# Patient Record
Sex: MALE | Race: ASIAN | Employment: UNEMPLOYED | URBAN - METROPOLITAN AREA
[De-identification: names, ages, dates, MRNs, and addresses within clinical notes are randomized per-mention and may not be internally consistent; named-entity substitution may affect disease eponyms.]

---

## 2017-01-20 ENCOUNTER — OFFICE VISIT (OUTPATIENT)
Dept: FAMILY MEDICINE | Facility: CLINIC | Age: 52
End: 2017-01-20

## 2017-01-20 VITALS
WEIGHT: 198 LBS | SYSTOLIC BLOOD PRESSURE: 145 MMHG | DIASTOLIC BLOOD PRESSURE: 101 MMHG | BODY MASS INDEX: 33.97 KG/M2 | OXYGEN SATURATION: 96 % | HEART RATE: 82 BPM | TEMPERATURE: 98.1 F

## 2017-01-20 DIAGNOSIS — E11.9 TYPE 2 DIABETES MELLITUS WITHOUT COMPLICATION, WITHOUT LONG-TERM CURRENT USE OF INSULIN (H): ICD-10-CM

## 2017-01-20 DIAGNOSIS — I10 ESSENTIAL HYPERTENSION, BENIGN: ICD-10-CM

## 2017-01-20 DIAGNOSIS — M10.072 ACUTE IDIOPATHIC GOUT OF LEFT ANKLE: Primary | ICD-10-CM

## 2017-01-20 DIAGNOSIS — I10 ESSENTIAL HYPERTENSION, BENIGN: Primary | ICD-10-CM

## 2017-01-20 DIAGNOSIS — R21 RASH: ICD-10-CM

## 2017-01-20 DIAGNOSIS — E78.5 HYPERLIPIDEMIA, UNSPECIFIED HYPERLIPIDEMIA TYPE: ICD-10-CM

## 2017-01-20 LAB
BUN SERPL-MCNC: 17.4 MG/DL (ref 7–21)
CALCIUM SERPL-MCNC: 10.1 MG/DL (ref 8.5–10.1)
CHLORIDE SERPLBLD-SCNC: 96.1 MMOL/L (ref 98–110)
CHOLEST SERPL-MCNC: 226.6 MG/DL (ref 0–200)
CHOLEST/HDLC SERPL: 5.2 {RATIO} (ref 0–5)
CO2 SERPL-SCNC: 28.6 MMOL/L (ref 20–32)
CREAT SERPL-MCNC: 0.8 MG/DL (ref 0.7–1.3)
GFR SERPL CREATININE-BSD FRML MDRD: 108.3 ML/MIN/1.7 M2
GLUCOSE SERPL-MCNC: 309.7 MG'DL (ref 70–99)
HBA1C MFR BLD: 8.3 % (ref 4.1–5.7)
HDLC SERPL-MCNC: 43.9 MG/DL
LDLC SERPL CALC-MCNC: 112 MG/DL (ref 0–129)
POTASSIUM SERPL-SCNC: 3.8 MMOL/DL (ref 3.2–4.6)
SODIUM SERPL-SCNC: 133.3 MMOL/L (ref 132–142)
TRIGL SERPL-MCNC: 354.5 MG/DL (ref 0–150)
URATE SERPL-MCNC: 7.2 MG/DL (ref 3–8)
VLDL CHOLESTEROL: 70.9 MG/DL (ref 7–32)

## 2017-01-20 RX ORDER — INDOMETHACIN 25 MG/1
CAPSULE ORAL
Qty: 60 CAPSULE | Refills: 1 | Status: SHIPPED | OUTPATIENT
Start: 2017-01-20 | End: 2017-05-25

## 2017-01-20 RX ORDER — TRIAMCINOLONE ACETONIDE 1 MG/ML
LOTION TOPICAL 2 TIMES DAILY
Qty: 60 ML | Refills: 1 | Status: SHIPPED | OUTPATIENT
Start: 2017-01-20 | End: 2019-09-17

## 2017-01-20 RX ORDER — INDOMETHACIN 25 MG/1
CAPSULE ORAL
Qty: 60 CAPSULE | Refills: 1 | Status: SHIPPED | OUTPATIENT
Start: 2017-01-20 | End: 2017-01-20

## 2017-01-20 NOTE — MR AVS SNAPSHOT
After Visit Summary   2017    Kyle Silva    MRN: [id number]           Patient Information     Date Of Birth          1965        Visit Information        Provider Department      2017 3:30 PM You Mitchell MD [facility]        Today's Diagnoses     Acute idiopathic gout of left ankle    -  1     Essential hypertension, benign         Rash         Type 2 diabetes mellitus without complication, without long-term current use of insulin (H)            Follow-ups after your visit        Who to contact     Please call your clinic at [phone] to:    Ask questions about your health    Make or cancel appointments    Discuss your medicines    Learn about your test results    Speak to your doctor   If you have compliments or concerns about an experience at your clinic, or if you wish to file a complaint, please contact [facility] Patient Relations at [phone] or email us at [email or web address]         Additional Information About Your Visit        MyChart Information     [employer] is an electronic gateway that provides easy, online access to your medical records. With [employer], you can request a clinic appointment, read your test results, renew a prescription or communicate with your care team.     To sign up for [employer] visit the website at [email or web address]   You will be asked to enter the access code listed below, as well as some personal information. Please follow the directions to create your username and password.     Your access code is: [email or web address]  Expires: 2017  4:54 PM     Your access code will  in 90 days. If you need help or a new code, please contact your [facility] or call [phone] for assistance.        Care EveryWhere ID     This is your Care EveryWhere ID. This could be used by other organizations to access your [facility] medical records  [id number]        Your Vitals  Were     Pulse Temperature Pulse Oximetry             82 98.1  F (36.7  C) (Oral) 96%          Blood Pressure from Last 3 Encounters:   01/20/17 145/101   12/18/15 138/89   12/02/15 176/108    Weight from Last 3 Encounters:   01/20/17 198 lb (89.812 kg)   12/18/15 192 lb 9.6 oz (87.363 kg)   12/02/15 190 lb 3.2 oz (86.274 kg)              We Performed the Following     Basic Metabolic Panel ([facility])     Hemoglobin A1c ([facility])     Lipid Panel ([facility])     Uric Acid ([facility])          Today's Medication Changes          These changes are accurate as of: 1/20/17  4:54 PM.  If you have any questions, ask your nurse or doctor.               Start taking these medicines.        Dose/Directions    indomethacin 25 MG capsule   Commonly known as:  INDOCIN   Used for:  Acute idiopathic gout of left ankle   Started by:  You Mitchell MD        2 caps po tid for 2 days, then 1 caps po tid for 2 days, then every 8 hours as needed.   Quantity:  60 capsule   Refills:  1       metFORMIN 500 MG tablet   Commonly known as:  GLUCOPHAGE   Used for:  Type 2 diabetes mellitus without complication, without long-term current use of insulin (H)   Started by:  You Mitchell MD        1 po q supper for 2 weeks, then 1 po bid for 2 weeks, then 1 po q am and 2 po q supper for 2 weeks, then 2 po bid   Quantity:  120 tablet   Refills:  2       triamcinolone 0.1 % lotion   Commonly known as:  KENALOG   Used for:  Rash   Started by:  You Mitchell MD        Apply topically 2 times daily As needed to scalp   Quantity:  60 mL   Refills:  1            Where to get your medicines      These medications were sent to [id number] - [place], [place] - [address] AT SEC [address]  [address], [place] [address]     Phone:  [phone]    - indomethacin 25 MG capsule  - metFORMIN 500 MG tablet  - triamcinolone 0.1 % lotion             Primary Care Provider Office Phone # Fax #     You Mitchell [phone]       [address] [address]        Thank you!     Thank you for choosing [facility]  for your care. Our goal is always to provide you with excellent care. Hearing back from our patients is one way we can continue to improve our services. Please take a few minutes to complete the written survey that you may receive in the mail after your visit with us. Thank you!             Your Updated Medication List - Protect others around you: Learn how to safely use, store and throw away your medicines at www.disposemymeds.org.          This list is accurate as of: 1/20/17  4:54 PM.  Always use your most recent med list.                   Brand Name Dispense Instructions for use    amLODIPine 10 MG tablet    NORVASC    30 tablet    Take 1 tablet (10 mg) by mouth daily       Blood Pressure Monitor Kit      Use as directed       hydrochlorothiazide 25 MG tablet    HYDRODIURIL    30 tablet    Take 1 tablet (25 mg) by mouth daily       indomethacin 25 MG capsule    INDOCIN    60 capsule    2 caps po tid for 2 days, then 1 caps po tid for 2 days, then every 8 hours as needed.       losartan 25 MG tablet    COZAAR    30 tablet    Take 1 tablet (25 mg) by mouth daily       metFORMIN 500 MG tablet    GLUCOPHAGE    120 tablet    1 po q supper for 2 weeks, then 1 po bid for 2 weeks, then 1 po q am and 2 po q supper for 2 weeks, then 2 po bid       metoprolol 25 MG 24 hr tablet    TOPROL-XL    30 tablet    Take 1 po qhs       triamcinolone 0.1 % lotion    KENALOG    60 mL    Apply topically 2 times daily As needed to scalp       TYLENOL 8 HOUR PO

## 2017-01-20 NOTE — PROGRESS NOTES
Patient Active Problem List    Diagnosis Date Noted     Noncompliance with medication regimen 12/03/2015     Priority: Medium     S/P ACL reconstruction 12/11/2014     Priority: Medium     Dr Mckeon @ [place]  Hamstring autograft  Parital medial and lateral meniscectomy       Essential hypertension, benign 03/21/2013     Priority: Medium     Intracranial hemorrhage (H) 03/21/2013     Priority: Medium     Has seen Dr Justina Whitaker @ [facility]  Problem list name updated by automated process. Provider to review       Major depressive disorder, single episode 03/21/2013     Priority: Medium     Problem list name updated by automated process. Provider to review       Obstructive sleep apnea 03/21/2013     Priority: Medium     Problem list name updated by automated process. Provider to review       Health Care Home 03/21/2013     Priority: Medium     Tier Level: 1  DX V65.8 REPLACED WITH [id number] HEALTH CARE HOME (04/08/2013)       There are no exam notes on file for this visit.  Chief Complaint   Patient presents with     RECHECK     Recheck Medication     Musculoskeletal Problem     left foot pain 12/15/16 - swelling - redness - warm to touch. is a little better now. pain is sharp - comes and goes. heel pain 2 weeks before when pushing left foot into boot.     Derm Problem     spot above left left ear x 3 - 4 months     Blood pressure 145/101, pulse 82, temperature 98.1  F (36.7  C), temperature source Oral, weight 198 lb (89.812 kg), SpO2 96 %.     SUBJECTIVE:  Kyle Silva is here with his wife today for followup of multiple problems.  His chief concern is left foot pain.  He had it for about a month.  It is associated with swelling and warmth.  It is very painful to weightbear.  It seemed to get worse when he ate shrimp or drank even a small amount of beer or alcohol.  No trauma.  No fever.  No prior history of gout or family history of gout.   He also needs followup of a spot above the left ear that is mildly itchy.   It isn't associated with hair loss.  It has been there for three or four months.   He also needs followup of hypertension.  He states that at home he is typically in the 130s/80s.  He doesn't have chest pain or shortness of breath.  He doesn't have nocturia, but he has polydipsia and polyuria.  He drinks two glasses of juice and two cans of pop per day.  His hypertension is complicated by an intracranial hemorrhage in 2003.  He is disabled as a result.   OBJECTIVE:     GENERAL:  Very pleasant male in no acute distress today.     VITAL SIGNS:  Blood pressure is elevated at 145/98 on my repeat.     SKIN:  His scalp has a circumferential lesion in the left temporal area that is about two cm, with slightly raised borders and no central clearing.  No alopecia.  He has a similar patch on the right side of his scalp as well, but it is considerably smaller.  It is nonspecific.  No rashes on the elbows.  He has a similar rash in the right axilla that is red, without central clearing, and located at the crease of the axilla.   CHEST:  Clear.   HEART:  Regular rate and rhythm.  Normal S1 and S2.  No murmur.   ABDOMEN:  Obese, but soft and nontender.   EXTREMITIES:  No edema.   MUSCULOSKELETAL:   In the left ankle, he has limited range of motion, tenderness over the joints, and moderate effusion and warmth.  There is no evidence of cellulitis.  Ankle is the area of swelling.  The rest of the foot, including the first MTP is negative.   LABS:  Labs are back and A1C is elevated, which is a new finding.  Blood sugar is 309.  Lipids are abnormal as well.   ASSESSMENT AND PLAN:   1.  Probable gout, left foot.  We'll check uric acid level and treat with indomethacin 50 mg t.i.d. x two days, then 25 mg t.i.d. x 2 days, and then 25 mg p.r.n.  This is a presumptive diagnosis at this point in time; but, given his monoarticular arthritis that seems to vary with food intake, it is the most likely diagnosis.  Follow up in one week if not  improving.     2.  Rash.  This seems to be eczematous tinea that is associated with alopecia.  We'll try triamcinolone lotion b.i.d. p.r.n. Follow up if it doesn't improve in the next two weeks.   3.  Hypertension, suboptimal control.  He'll continue home monitoring and we'll reevaluate in 3-4 weeks.   4.  Diabetes, new diagnosis.  A prescription for test strips and lancets was given.  Advice was given to stop the sugary beverages and limit portions of rice.  Diabetic education referral was given.  I want him to check blood sugars b.i.d. before meals.  We'll have him come back next week for nurse visit to learn about monitoring here in the office, but also a diabetic referral was given.  Follow up for that in one month.   5.  Hyperlipidemia.  He'll need treatment with statin.  We'll discuss this next time.       Results for orders placed or performed in visit on 01/20/17   Hemoglobin A1c ([facility])   Result Value Ref Range    Hemoglobin A1C 8.3 (H) 4.1 - 5.7 %   Basic Metabolic Panel ([place])   Result Value Ref Range    Urea Nitrogen 17.4 7.0 - 21.0 mg/dL    Calcium 10.1 8.5 - 10.1 mg/dL    Chloride 96.1 (L) 98.0 - 110.0 mmol/L    Carbon Dioxide 28.6 20.0 - 32.0 mmol/L    Creatinine 0.8 0.7 - 1.3 mg/dL    Glucose 309.7 (H) 70.0 - 99.0 mg'dL    Potassium 3.8 3.2 - 4.6 mmol/dL    Sodium 133.3 132.0 - 142.0 mmol/L    GFR Estimate 108.3 mL/min/1.7 m2    GFR Estimate If Black 131.1 mL/min/1.7 m2   Lipid Panel ([place])   Result Value Ref Range    Cholesterol 226.6 (H) 0.0 - 200.0 mg/dL    Cholesterol/HDL Ratio 5.2 (H) 0.0 - 5.0    HDL Cholesterol 43.9 >40.0 mg/dL    LDL Cholesterol Calculated 112 0 - 129 mg/dL    Triglycerides 354.5 (H) 0.0 - 150.0 mg/dL    VLDL Cholesterol 70.9 (H) 7.0 - 32.0 mg/dL

## 2017-01-20 NOTE — LETTER
[address] [address]  Phone: [phone]  Fax: [phone]             Dear Mr. Silva         I tried calling you many times, but there is no answer.  This is the question that Dr. Mitchell want to know so please call us back and we discuss. Thanks!    Please call pt through [facility] staff:   1.  I want to confirm that he is making an appointment with diabetic education, where he was referred.  Please confirm.   2.  His cholesterol is high.  He is likely to need medication to control that in the future, and we can discuss it at his next follow-up, which should be in March.   3.  Is his foot getting better?  I assume that since he has not returned, it has improved.   Thanks   ERIBERTO Mitchell MD    Thank you very much      Lisa Elder CMA

## 2017-01-23 ENCOUNTER — TELEPHONE (OUTPATIENT)
Dept: FAMILY MEDICINE | Facility: CLINIC | Age: 52
End: 2017-01-23

## 2017-01-23 PROBLEM — E11.9 TYPE 2 DIABETES MELLITUS WITHOUT COMPLICATION, WITHOUT LONG-TERM CURRENT USE OF INSULIN (H): Status: ACTIVE | Noted: 2017-01-23

## 2017-01-23 PROBLEM — E78.5 HYPERLIPIDEMIA, UNSPECIFIED HYPERLIPIDEMIA TYPE: Status: ACTIVE | Noted: 2017-01-23

## 2017-01-23 NOTE — PATIENT INSTRUCTIONS
DM EDUCATION REFERRAL  Waiting for final encounter to close Madonna Wright 1/23/2017 12:11 PM  [facility]  [phone]  Referral and records have been faxed they will contact pt to schedule  Madonna Wright 12:00 PM 2/2/2017

## 2017-01-23 NOTE — TELEPHONE ENCOUNTER
----- Message from You Mitchell MD sent at 1/21/2017 12:13 PM CST -----  Regarding: please call Monday  Shira needs a nurse visit early next week primarily for diabetes:  Needs education on self-monitoring and initial nutritional therapy.  Should have f/up appt w/ me in 1 month.  Please let them know I have sent rx's for diabetic supplies to his pharmacy and he should bring them to the nurse visit.  THANKS!  CF

## 2017-01-24 NOTE — TELEPHONE ENCOUNTER
She scheduled an appointment in February with  and I relayed the message re the supplies at the nurse visit.

## 2017-02-02 ENCOUNTER — COMMUNICATION - HEALTHEAST (OUTPATIENT)
Dept: ENDOCRINOLOGY | Facility: CLINIC | Age: 52
End: 2017-02-02

## 2017-02-02 ENCOUNTER — RECORDS - HEALTHEAST (OUTPATIENT)
Dept: ADMINISTRATIVE | Facility: OTHER | Age: 52
End: 2017-02-02

## 2017-02-08 NOTE — PROGRESS NOTES
Quick Note:    Please call pt through [facility] staff:  1. I want to confirm that he is making an appointment with diabetic education, where he was referred. Please confirm.  2. His cholesterol is high. He is likely to need medication to control that in the future, and we can discuss it at his next follow-up, which should be in March.  3. Is his foot getting better? I assume that since he has not returned, it has improved.  Thanks  ERIBERTO Mitchell  ______

## 2017-03-01 ENCOUNTER — TELEPHONE (OUTPATIENT)
Dept: FAMILY MEDICINE | Facility: CLINIC | Age: 52
End: 2017-03-01

## 2017-03-01 NOTE — TELEPHONE ENCOUNTER
[facility] phone call message- general phone call:    Reason for call: They have tried to schedule this pt for DM ed but unable to reach the pt     Return call needed: Yes    OK to leave a message on voice mail? Yes    Primary language: English      needed? No    Call taken on March 1, 2017 at 8:27 AM by Gia Smith

## 2017-03-01 NOTE — TELEPHONE ENCOUNTER
.  Sisi  This patient is a new diabetic in need of f/up.  Can you assist with outreach?  He needs diabetic ed and f/up here.  ERIBERTO Mitchell

## 2017-05-09 ENCOUNTER — OFFICE VISIT (OUTPATIENT)
Dept: FAMILY MEDICINE | Facility: CLINIC | Age: 52
End: 2017-05-09

## 2017-05-09 VITALS
HEART RATE: 79 BPM | TEMPERATURE: 98 F | BODY MASS INDEX: 34.06 KG/M2 | SYSTOLIC BLOOD PRESSURE: 194 MMHG | DIASTOLIC BLOOD PRESSURE: 116 MMHG | WEIGHT: 198.4 LBS

## 2017-05-09 DIAGNOSIS — I10 ESSENTIAL HYPERTENSION, BENIGN: ICD-10-CM

## 2017-05-09 DIAGNOSIS — E78.5 HYPERLIPIDEMIA, UNSPECIFIED HYPERLIPIDEMIA TYPE: ICD-10-CM

## 2017-05-09 DIAGNOSIS — M10.9 ACUTE GOUTY ARTHRITIS: Primary | ICD-10-CM

## 2017-05-09 DIAGNOSIS — E11.8 TYPE 2 DIABETES MELLITUS WITH COMPLICATION, UNSPECIFIED LONG TERM INSULIN USE STATUS: Primary | ICD-10-CM

## 2017-05-09 DIAGNOSIS — L40.9 PSORIASIS: ICD-10-CM

## 2017-05-09 DIAGNOSIS — E11.8 TYPE 2 DIABETES MELLITUS WITH COMPLICATION, UNSPECIFIED LONG TERM INSULIN USE STATUS: ICD-10-CM

## 2017-05-09 LAB
ALBUMIN SERPL-MCNC: 4.3 MG/DL (ref 3.9–5.1)
ALP SERPL-CCNC: 79 U/L (ref 40–150)
ALT SERPL-CCNC: 29.7 U/L (ref 0–45)
AST SERPL-CCNC: 22.8 U/L (ref 0–55)
BILIRUB SERPL-MCNC: 1.3 MG/DL (ref 0.2–1.3)
BUN SERPL-MCNC: 14.8 MG/DL (ref 7–21)
CALCIUM SERPL-MCNC: 10.1 MG/DL (ref 8.5–10.1)
CHLORIDE SERPLBLD-SCNC: 102 MMOL/L (ref 98–110)
CHOLEST SERPL-MCNC: 234.6 MG/DL (ref 0–200)
CHOLEST/HDLC SERPL: 5.2 {RATIO} (ref 0–5)
CO2 SERPL-SCNC: 24.2 MMOL/L (ref 20–32)
CREAT SERPL-MCNC: 0.9 MG/DL (ref 0.7–1.3)
CREAT UR-MCNC: 41.2 MG/DL
GFR SERPL CREATININE-BSD FRML MDRD: >90 ML/MIN/1.7 M2
GLUCOSE SERPL-MCNC: 131.7 MG'DL (ref 70–99)
HBA1C MFR BLD: 7.6 % (ref 4.1–5.7)
HDLC SERPL-MCNC: 44.8 MG/DL
LDLC SERPL CALC-MCNC: 166 MG/DL (ref 0–129)
MICROALBUMIN UR-MCNC: 146.14 MG/DL (ref 0–1.99)
MICROALBUMIN/CREAT UR: 3547.1 MG/G
POTASSIUM SERPL-SCNC: 3.9 MMOL/DL (ref 3.2–4.6)
PROT SERPL-MCNC: 7.8 G/DL (ref 6.8–8.8)
SODIUM SERPL-SCNC: 135.5 MMOL/L (ref 132–142)
TRIGL SERPL-MCNC: 120.3 MG/DL (ref 0–150)
VLDL CHOLESTEROL: 24.1 MG/DL (ref 7–32)

## 2017-05-09 RX ORDER — TRIAMCINOLONE ACETONIDE 5 MG/G
CREAM TOPICAL 2 TIMES DAILY
Qty: 30 G | Refills: 1 | Status: SHIPPED | OUTPATIENT
Start: 2017-05-09 | End: 2017-05-25

## 2017-05-09 RX ORDER — INDOMETHACIN 25 MG/1
CAPSULE ORAL
Qty: 60 CAPSULE | Refills: 1 | Status: SHIPPED | OUTPATIENT
Start: 2017-05-09 | End: 2017-05-25

## 2017-05-09 RX ORDER — LOSARTAN POTASSIUM AND HYDROCHLOROTHIAZIDE 25; 100 MG/1; MG/1
1 TABLET ORAL DAILY
Qty: 30 TABLET | Refills: 1 | Status: SHIPPED | OUTPATIENT
Start: 2017-05-09 | End: 2017-05-25

## 2017-05-09 NOTE — PROGRESS NOTES
Patient Active Problem List    Diagnosis Date Noted     Type 2 diabetes mellitus without complication, without long-term current use of insulin (H) 01/23/2017     Priority: Medium     Hyperlipidemia, unspecified hyperlipidemia type 01/23/2017     Priority: Medium     Noncompliance with medication regimen 12/03/2015     Priority: Medium     S/P ACL reconstruction 12/11/2014     Priority: Medium     Dr Mckeon @ [place]  Hamstring autograft  Parital medial and lateral meniscectomy       Essential hypertension, benign 03/21/2013     Priority: Medium     Intracranial hemorrhage (H) 03/21/2013     Priority: Medium     Has seen Dr Justina Whitaker @ [facility]  Problem list name updated by automated process. Provider to review       Major depressive disorder, single episode 03/21/2013     Priority: Medium     Problem list name updated by automated process. Provider to review       Obstructive sleep apnea 03/21/2013     Priority: Medium     Problem list name updated by automated process. Provider to review       Health Care Home 03/21/2013     Priority: Medium     Tier Level: 1  DX V65.8 REPLACED WITH [id number] HEALTH CARE HOME (04/08/2013)       There are no exam notes on file for this visit.  Chief Complaint   Patient presents with     Diabetes     f/u on diabetes     Derm Problem     the rash on his head is not going away and it is itching more the ointment that was given did not help      Arthritis     right foot has some sharp shooting pain, it was swelling and painful, the pain was in the ankle and now it is moving towards the toe, would like to get some medicaiton for this the last medication did not help      Blood pressure (!) 194/116, pulse 79, temperature 98  F (36.7  C), temperature source Oral, weight 198 lb 6.4 oz (90 kg).  Results for orders placed or performed in visit on 05/09/17   Hemoglobin A1c ([facility])   Result Value Ref Range    Hemoglobin A1C 7.6 (H) 4.1 - 5.7 %   Lipid Panel ([facility])   Result Value Ref  Range    Cholesterol 234.6 (H) 0.0 - 200.0 mg/dL    Cholesterol/HDL Ratio 5.2 (H) 0.0 - 5.0    HDL Cholesterol 44.8 >40.0 mg/dL    LDL Cholesterol Calculated 166 (H) 0 - 129 mg/dL    Triglycerides 120.3 0.0 - 150.0 mg/dL    VLDL Cholesterol 24.1 7.0 - 32.0 mg/dL   Comprehensive Metabolic Panel ([place])   Result Value Ref Range    Albumin 4.3 3.9 - 5.1 mg/dL    Alkaline Phosphatase 79.0 40.0 - 150.0 U/L    ALT 29.7 0.0 - 45.0 U/L    AST 22.8 0.0 - 55.0 U/L    Bilirubin Total 1.3 0.2 - 1.3 mg/dL    Urea Nitrogen 14.8 7.0 - 21.0 mg/dL    Calcium 10.1 8.5 - 10.1 mg/dL    Chloride 102.0 98.0 - 110.0 mmol/L    Carbon Dioxide 24.2 20.0 - 32.0 mmol/L    Creatinine 0.9 0.7 - 1.3 mg/dL    Glucose 131.7 (H) 70.0 - 99.0 mg'dL    Potassium 3.9 3.2 - 4.6 mmol/dL    Sodium 135.5 132.0 - 142.0 mmol/L    Protein Total 7.8 6.8 - 8.8 g/dL    GFR Estimate >90 >60.0 mL/min/1.7 m2    GFR Estimate If Black >90 >60.0 mL/min/1.7 m2   feet really hurt  Last time feet were around ankle, but now its over left great toe.  Just started to get better.  Will exercise for 3-4 days, then has to rest.      SUBJECTIVE:  Kyle Silva is here with his son, Ritchie, today for followup of his diabetes, right toe pain, hypertension, and a rash.  Since I have seen him last time, he had multiple flares of gout.  He was traveling down in [place] and had developed some toe pain.  He took some medication for it, and as a result of fear of interaction with his current blood pressure medications, he stopped his antihypertensives.  He doesn't have headache, SOB, or swelling.  His toe pain is getting better, but when it was severe he had a hard time weightbearing.   He doesn't have polydipsia or polyuria.  He is trying to exercise and watch his portions to help control his blood sugars a little bit better.  He tolerated his blood pressure medications well, but it was a complicated regimen.   He has a rash on the left temple.  It is a well-demarcated rash and has  some thick scale.  It is pruritic.  He has a similar area just above his right ear and in his right axilla.  Triamcinolone lotion was used in the past and this helped a little bit, but it hasn't completely resolved it.     REVIEW OF SYSTEMS:   Negative for polydipsia, polyuria, hypoglycemia, chest pain, or SOB. He has had a previous stroke.  Again, he isn't adherent to medications.   OBJECTIVE:   VITAL SIGNS:  His blood pressure is markedly elevated today.   CHEST:  Clear.   HEART:  Regular rate and rhythm.  Normal S1 and S2.  No murmur.   ABDOMEN:  Soft and nontender.   EXTREMITIES:  Full pulses.  No edema.  He does have mild swelling over his first MTP joint on the right.  This is tender to the touch, but it isn't red or warm.  There is a small effusion around the joint.     SKIN:  Examination of the skin reveals a well-demarcated oval-shaped rash just above his left ear within the scalp.  It has raised edges, but no plaque.  He has a similar area of erythema in the crease between the pinna and his scalp on the right side, and a small area of erythema in the right axilla.   LABS:  Labs are back.  His cholesterol isn't optimal.  His A1C is actually improved from the 8 range down to the 7 range.  His urine microalbumin is markedly abnormal.   ASSESSMENT/PLAN:   1.  Type II diabetes mellitus.  His glycemic control, it is actually a little bit better with lifestyle and I congratulated him on that.  I think weight loss and exercise is the key for him in the future.  Managing his medications has been challenging, because he often stops them on his own, without consulting us; this is out of fear of interactions.  I reassured him that there aren't interactions for most of the medications prescribed to him, and I check those routinely.  He'll try to be a bit more compliant and let us know if things change.     2.  Hypertension.  I switched him from a quite complicated regimen of beta blocker, thiazide diuretic, calcium  channel blocker, and losartan, to simply losartan/HCTZ 100/25 mg, one tablet daily.  This is for acknowledging that he has gout and that HCTZ may increase his uric acid a little bit, but I think this is a chance worth taking if we can get his blood pressure controlled with a simplified regimen.  He also needs a maximum dose ARB, and I've asked him to follow up in 7-10 days so we can get his blood pressure checked again.  I'm quite concerned about his microalbuminuria that we see today.  I'm going to repeat urine microalbumin and get a regular dipstick UA when he returns next time.     3.  Dyslipidemia.  He clearly needs to be on a statin at some point, but given the complexity of the visit and his difficulty with adherence, we won't address this at this time.    4.  Rash.  I think this is psoriasis of the scalp.  I prescribed triamcinolone 0.5% cream to apply b.i.d. p.r.n.     Continued dictation, Kyle Silva:     Gout.  His uric acid was 7.2.  He still has some residual gout symptoms in his feet.  I gave him some indomethacin to use on a p.r.n. basis in the future.  He has only had two or three flareups of gout, and he doesn't have tophi.  I think the losartan may lower his uric acid  and HCTZ may increase uric acid; hopefully, the combination will balance things out and not increase his flares.  In the future, I could consider allopurinol with colchicine for six months to help reduce future attacks.   I spent more than 45 minutes with the patient and his son in counseling and coordination of care, all of which was spent in chart review, lab review, and discussion.

## 2017-05-09 NOTE — PATIENT INSTRUCTIONS
Start the new BP medication one pill once daily    Use the gout medication as needed for pain and swelling.      Try the new cream 2 times daily.    Diet recommendations for gout   The current recommendations for lowering levels of urate in your blood include eating less:      Red meat      Seafood      Beer and hard alcohol (eg, gin, vodka)      Foods and drinks that contain high-fructose corn syrup (found in sweets and non-diet sodas)    You are encouraged to eat and drink:      Low-fat dairy products      Foods made with complex carbohydrates (whole grains, brown rice, oats, beans)      A moderate amount of wine (up to two 5 ounce servings per day) is not likely to increase the risk of a gout attack      Coffee may decrease the risk of gout attacks      Vitamin C (500 mg per day) has a mild urate-lowering effect and may be recommended    Changes in diet are often recommended, along with medications. Making changes in your diet without taking a medicine is not likely to make a big difference in your blood urate levels; following a very strict gout diet only lowers blood urate levels slightly (15 to 20 percent).    Dietary guidelines for patients with gout have changed over time, and it is not completely clear which combination of foods is best. The list above includes a few suggestions to lower your urate level.

## 2017-05-09 NOTE — MR AVS SNAPSHOT
After Visit Summary   5/9/2017    Kyle Silva    MRN: [id number]           Patient Information     Date Of Birth          1965        Visit Information        Provider Department      5/9/2017 11:00 AM You Mitchell MD [facility]        Today's Diagnoses     Acute gouty arthritis    -  1    Type 2 diabetes mellitus with complication, unspecified long term insulin use status (H)        Essential hypertension, benign        Psoriasis          Care Instructions    Start the new BP medication one pill once daily    Use the gout medication as needed for pain and swelling.      Try the new cream 2 times daily.    Diet recommendations for gout   The current recommendations for lowering levels of urate in your blood include eating less:      Red meat      Seafood      Beer and hard alcohol (eg, gin, vodka)      Foods and drinks that contain high-fructose corn syrup (found in sweets and non-diet sodas)    You are encouraged to eat and drink:      Low-fat dairy products      Foods made with complex carbohydrates (whole grains, brown rice, oats, beans)      A moderate amount of wine (up to two 5 ounce servings per day) is not likely to increase the risk of a gout attack      Coffee may decrease the risk of gout attacks      Vitamin C (500 mg per day) has a mild urate-lowering effect and may be recommended    Changes in diet are often recommended, along with medications. Making changes in your diet without taking a medicine is not likely to make a big difference in your blood urate levels; following a very strict gout diet only lowers blood urate levels slightly (15 to 20 percent).    Dietary guidelines for patients with gout have changed over time, and it is not completely clear which combination of foods is best. The list above includes a few suggestions to lower your urate level.          Follow-ups after your visit        Follow-up notes from your care team     Return in about 2 weeks (around  2017) for BP Recheck.      Who to contact     Please call your clinic at [phone] to:    Ask questions about your health    Make or cancel appointments    Discuss your medicines    Learn about your test results    Speak to your doctor   If you have compliments or concerns about an experience at your clinic, or if you wish to file a complaint, please contact [facility] Patient Relations at [phone] or email us at [email or web address]         Additional Information About Your Visit        [employer] Information     [employer] is an electronic gateway that provides easy, online access to your medical records. With [employer], you can request a clinic appointment, read your test results, renew a prescription or communicate with your care team.     To sign up for [employer] visit the website at [email or web address]   You will be asked to enter the access code listed below, as well as some personal information. Please follow the directions to create your username and password.     Your access code is: [email or web address]  Expires: 2017 12:24 PM     Your access code will  in 90 days. If you need help or a new code, please contact your [facility] Clinic or call [phone] for assistance.        Care EveryWhere ID     This is your Care EveryWhere ID. This could be used by other organizations to access your [facility] medical records  [id number]        Your Vitals Were     Pulse Temperature BMI (Body Mass Index)             79 98  F (36.7  C) (Oral) 34.06 kg/m2          Blood Pressure from Last 3 Encounters:   17 (!) 194/116   17 (!) 145/101   12/18/15 138/89    Weight from Last 3 Encounters:   17 198 lb 6.4 oz (90 kg)   17 198 lb (89.8 kg)   12/18/15 192 lb 9.6 oz (87.4 kg)              We Performed the Following     Comprehensive Metabolic Panel ([place])     Hemoglobin A1c ([facility])     Lipid Panel ([facility])     Microalbumin  Creatinine Ratio Random Ur ([facility])          Today's Medication Changes          These changes are accurate as of: 5/9/17 12:25 PM.  If you have any questions, ask your nurse or doctor.               Start taking these medicines.        Dose/Directions    losartan-hydrochlorothiazide 100-25 MG per tablet   Commonly known as:  HYZAAR   Used for:  Essential hypertension, benign   Started by:  You Mitchell MD        Dose:  1 tablet   Take 1 tablet by mouth daily   Quantity:  30 tablet   Refills:  1         These medicines have changed or have updated prescriptions.        Dose/Directions    * indomethacin 25 MG capsule   Commonly known as:  INDOCIN   This may have changed:  Another medication with the same name was added. Make sure you understand how and when to take each.   Used for:  Acute idiopathic gout of left ankle   Changed by:  You Mitchell MD        2 caps po tid for 2 days, then 1 caps po tid for 2 days, then every 8 hours as needed.   Quantity:  60 capsule   Refills:  1       * indomethacin 25 MG capsule   Commonly known as:  INDOCIN   This may have changed:  You were already taking a medication with the same name, and this prescription was added. Make sure you understand how and when to take each.   Used for:  Acute gouty arthritis   Changed by:  You Mitchell MD        2 po tid for 2 days, then 1 po tid for 2 days, then 1 po q 8 hours as needed for gout.  Take with food   Quantity:  60 capsule   Refills:  1       * triamcinolone 0.1 % lotion   Commonly known as:  KENALOG   This may have changed:  Another medication with the same name was added. Make sure you understand how and when to take each.   Used for:  Rash   Changed by:  You Mitchell MD        Apply topically 2 times daily As needed to scalp   Quantity:  60 mL   Refills:  1       * triamcinolone 0.5 % cream   Commonly known as:  KENALOG   This may have changed:  You were already taking a medication with the  same name, and this prescription was added. Make sure you understand how and when to take each.   Used for:  Psoriasis   Changed by:  You Mitchell MD        Apply topically 2 times daily   Quantity:  30 g   Refills:  1       * Notice:  This list has 4 medication(s) that are the same as other medications prescribed for you. Read the directions carefully, and ask your doctor or other care provider to review them with you.      Stop taking these medicines if you haven't already. Please contact your care team if you have questions.     amLODIPine 10 MG tablet   Commonly known as:  NORVASC   Stopped by:  You Mitchell MD           hydrochlorothiazide 25 MG tablet   Commonly known as:  HYDRODIURIL   Stopped by:  You Mitchell MD           losartan 25 MG tablet   Commonly known as:  COZAAR   Stopped by:  You Mitchell MD           metoprolol 25 MG 24 hr tablet   Commonly known as:  TOPROL-XL   Stopped by:  You Mitchell MD                Where to get your medicines      These medications were sent to [id number] - [place], [place] - [address]  AT [address]  [address], [place] [address]     Phone:  [phone]     indomethacin 25 MG capsule    losartan-hydrochlorothiazide 100-25 MG per tablet    triamcinolone 0.5 % cream                Primary Care Provider Office Phone # Fax #    You Mitchell [phone]       [address] [address]        Thank you!     Thank you for choosing [facility]  for your care. Our goal is always to provide you with excellent care. Hearing back from our patients is one way we can continue to improve our services. Please take a few minutes to complete the written survey that you may receive in the mail after your visit with us. Thank you!             Your Updated Medication List - Protect others around you: Learn how to safely use, store and  throw away your medicines at www.disposemymeds.org.          This list is accurate as of: 5/9/17 12:25 PM.  Always use your most recent med list.                   Brand Name Dispense Instructions for use    blood glucose lancets standard    no brand specified    1 Box    Use to test blood sugar 2 times daily or as directed.       blood glucose monitoring meter device kit    no brand specified    1 kit    Use to test blood sugar 2 times daily       blood glucose monitoring test strip    no brand specified    100 strip    Use to test blood sugars 2 times daily or as directed       Blood Pressure Monitor Kit      Use as directed       * indomethacin 25 MG capsule    INDOCIN    60 capsule    2 caps po tid for 2 days, then 1 caps po tid for 2 days, then every 8 hours as needed.       * indomethacin 25 MG capsule    INDOCIN    60 capsule    2 po tid for 2 days, then 1 po tid for 2 days, then 1 po q 8 hours as needed for gout.  Take with food       losartan-hydrochlorothiazide 100-25 MG per tablet    HYZAAR    30 tablet    Take 1 tablet by mouth daily       metFORMIN 500 MG tablet    GLUCOPHAGE    120 tablet    1 po q supper for 2 weeks, then 1 po bid for 2 weeks, then 1 po q am and 2 po q supper for 2 weeks, then 2 po bid       * triamcinolone 0.1 % lotion    KENALOG    60 mL    Apply topically 2 times daily As needed to scalp       * triamcinolone 0.5 % cream    KENALOG    30 g    Apply topically 2 times daily       TYLENOL 8 HOUR PO          * Notice:  This list has 4 medication(s) that are the same as other medications prescribed for you. Read the directions carefully, and ask your doctor or other care provider to review them with you.

## 2017-05-10 NOTE — PROGRESS NOTES
Please call results to pt's wife, Shira Soni @ [phone] (his request during visit)  His urine test is abnormal, and it's very important for him to take his new BP medication, losartan/hctx once every day and follow-up as recommended in 10 days or so for a med check.  She will help schedule that appointment  Thanks  CF.

## 2017-05-25 ENCOUNTER — TELEPHONE (OUTPATIENT)
Dept: FAMILY MEDICINE | Facility: CLINIC | Age: 52
End: 2017-05-25

## 2017-05-25 ENCOUNTER — OFFICE VISIT (OUTPATIENT)
Dept: FAMILY MEDICINE | Facility: CLINIC | Age: 52
End: 2017-05-25

## 2017-05-25 VITALS
SYSTOLIC BLOOD PRESSURE: 171 MMHG | BODY MASS INDEX: 33.8 KG/M2 | HEART RATE: 74 BPM | HEIGHT: 64 IN | WEIGHT: 198 LBS | TEMPERATURE: 97.9 F | DIASTOLIC BLOOD PRESSURE: 105 MMHG

## 2017-05-25 DIAGNOSIS — E11.9 TYPE 2 DIABETES MELLITUS WITHOUT COMPLICATION, WITHOUT LONG-TERM CURRENT USE OF INSULIN (H): Primary | ICD-10-CM

## 2017-05-25 DIAGNOSIS — I10 ESSENTIAL HYPERTENSION: ICD-10-CM

## 2017-05-25 DIAGNOSIS — I10 ESSENTIAL HYPERTENSION, BENIGN: ICD-10-CM

## 2017-05-25 DIAGNOSIS — R80.9 PROTEINURIA: ICD-10-CM

## 2017-05-25 DIAGNOSIS — L21.9 SEBORRHEIC DERMATITIS OF SCALP: ICD-10-CM

## 2017-05-25 DIAGNOSIS — Z36.3 ENCOUNTER FOR ROUTINE SCREENING FOR MALFORMATION USING ULTRASONICS: ICD-10-CM

## 2017-05-25 DIAGNOSIS — M10.9 ACUTE GOUTY ARTHRITIS: ICD-10-CM

## 2017-05-25 LAB
BUN SERPL-MCNC: 18 MG/DL (ref 7–21)
CALCIUM SERPL-MCNC: 9.8 MG/DL (ref 8.5–10.1)
CHLORIDE SERPLBLD-SCNC: 99.9 MMOL/L (ref 98–110)
CO2 SERPL-SCNC: 28.8 MMOL/L (ref 20–32)
CREAT SERPL-MCNC: 0.8 MG/DL (ref 0.7–1.3)
CREAT UR-MCNC: 59.3 MG/DL
GFR SERPL CREATININE-BSD FRML MDRD: >90 ML/MIN/1.7 M2
GLUCOSE SERPL-MCNC: 183.2 MG'DL (ref 70–99)
MICROALBUMIN UR-MCNC: 96.85 MG/DL (ref 0–1.99)
MICROALBUMIN/CREAT UR: 1633.2 MG/G
POTASSIUM SERPL-SCNC: 3.8 MMOL/DL (ref 3.2–4.6)
SODIUM SERPL-SCNC: 133.7 MMOL/L (ref 132–142)

## 2017-05-25 RX ORDER — AMLODIPINE BESYLATE 5 MG/1
5 TABLET ORAL DAILY
Qty: 30 TABLET | Refills: 1 | Status: SHIPPED | OUTPATIENT
Start: 2017-05-25 | End: 2017-07-11

## 2017-05-25 RX ORDER — HALCINONIDE 1 MG/G
CREAM TOPICAL 2 TIMES DAILY
Qty: 60 G | Refills: 1 | Status: SHIPPED | OUTPATIENT
Start: 2017-05-25 | End: 2018-02-01

## 2017-05-25 RX ORDER — LOSARTAN POTASSIUM AND HYDROCHLOROTHIAZIDE 25; 100 MG/1; MG/1
1 TABLET ORAL DAILY
Qty: 30 TABLET | Refills: 1 | Status: SHIPPED | OUTPATIENT
Start: 2017-05-25 | End: 2017-07-11

## 2017-05-25 NOTE — TELEPHONE ENCOUNTER
PA needed for Halog cream 0.1%. Per pharmacy, plan alternative meds include: halobetasol or clobetasol cream or ointment.     Will you send an alt or would you like to fill out a PA? Thank you.   Routed to Dr. Mitchell. /COLLETTE Chatman

## 2017-05-25 NOTE — PROGRESS NOTES
"Patient Active Problem List    Diagnosis Date Noted     Type 2 diabetes mellitus without complication, without long-term current use of insulin (H) 01/23/2017     Priority: Medium     Hyperlipidemia, unspecified hyperlipidemia type 01/23/2017     Priority: Medium     Noncompliance with medication regimen 12/03/2015     Priority: Medium     S/P ACL reconstruction 12/11/2014     Priority: Medium     Dr Mckeon @ [place]  Hamstring autograft  Parital medial and lateral meniscectomy       Essential hypertension, benign 03/21/2013     Priority: Medium     Intracranial hemorrhage (H) 03/21/2013     Priority: Medium     Has seen Dr Justina Whitaker @ [facility]  Problem list name updated by automated process. Provider to review       Major depressive disorder, single episode 03/21/2013     Priority: Medium     Problem list name updated by automated process. Provider to review       Obstructive sleep apnea 03/21/2013     Priority: Medium     Problem list name updated by automated process. Provider to review       Health Care Home 03/21/2013     Priority: Medium     Tier Level: 1  DX V65.8 REPLACED WITH [facility] (04/08/2013)       There are no exam notes on file for this visit.  Chief Complaint   Patient presents with     RECHECK     follow up medication and blood pressure     Forms     disability paper works     Medication Request     like to get a different medication for his gout (his friend show him Zipsor work better).     Blood pressure (!) 171/105, pulse 74, temperature 97.9  F (36.6  C), temperature source Oral, height 5' 4\" (162.6 cm), weight 198 lb (89.8 kg).   SUBJECTIVE:  Kyle Silva is here for followup of his hypertension, proteinuria, to have some forms completed, and gout.  He also asked me to check a rash on his scalp.  Regarding the rash, it is still pruritic.  It is in the left temporal area.  Triamcinolone hasn't been helpful.   1.  Regarding his hypertension, he is taking Losartan/HCTZ and " tolerates it well.  He is trying to walk more.  Interestingly, blood pressures at home are in the 140s systolic, while they are much higher here; however, he has checked with a wrist cuff only.  He doesn't have headache, fatigue, or SOB.     2.  His gout hasn't flared; however, he is worried about how it might flare in the future and what he might do.  He had a friend suggest diclofenac and he tried it and had very nice relief, so he would like to try that as an alternative to indomethacin.   3.  His stroke has left him with mild dysarthria, slow speech, right arm discoordination, and right-sided weakness.  He has been unable to work for many years and he has another disability form for me to document today.  He seemed not to require therapy because his symptoms have been stable.  He is able to stand and walk, but he has a hard time with maneuvers with his right hand.  He rarely drives.   OBJECTIVE:     GENERAL:  Patient is alert, pleasant, and in no acute distress.  His speech is hard to decipher for me because of the language barrier.     HEENT:  His cranial nerves are intact.  He has a well-defined, red, and circular rash above the left ear, w/out much scale.   CHEST:  Clear.   HEART:  Regular rate and rhythm.  Normal S1 and S2.  No murmur.   EXTREMITIES:  No edema or erythema to suggest acute gout.   NEUROLOGIC:  Sensation to light touch on the right arm is diminished as compared to the left.  Biceps and wrist flexion are 4+/5.  Wrist extension and elbow extension are 4/5.  His  is 4+/5 on the right as compared to the left.  Rapid movement with thumb-finger opposition is slow but accurate on the right.  His hip and knee strength is symmetrical.   ASSESSMENT/PLAN:   1.  Left-sided stroke that was hemorrhagic.  I documented this for the insurance company and copied it for the chart.   2.  Gout.   I gave him some diclofenac to use on a p.r.n. basis instead of indomethacin; we'll try this instead of borrowing  from close friend.     3.  Hypertension.  It has a white coat component to it, but I'm unsure about the accuracy of his home cuff.  Nevertheless, he isn't at goal.  We'll continue losartan/HCTZ and add amlodipine five mg q.h.s. and recheck in a month.  Bicep blood pressure cuff order was written for him.  He'll bring in home readings checked b.i.d. and the cuff itself next time when he returns.   4.  Seborrheic dermatitis.  We'll try ??  cream p.r.n.    5.  Proteinuria.  It was markedly abnormal last time.  We'll repeat that today.  Clearly tight blood pressure control is important for him.       Results for orders placed or performed in visit on 05/25/17   Basic Metabolic Panel ([place])   Result Value Ref Range    Urea Nitrogen 18.0 7.0 - 21.0 mg/dL    Calcium 9.8 8.5 - 10.1 mg/dL    Chloride 99.9 98.0 - 110.0 mmol/L    Carbon Dioxide 28.8 20.0 - 32.0 mmol/L    Creatinine 0.8 0.7 - 1.3 mg/dL    Glucose 183.2 (H) 70.0 - 99.0 mg'dL    Potassium 3.8 3.2 - 4.6 mmol/dL    Sodium 133.7 132.0 - 142.0 mmol/L    GFR Estimate >90 >60.0 mL/min/1.7 m2    GFR Estimate If Black >90 >60.0 mL/min/1.7 m2

## 2017-05-25 NOTE — MR AVS SNAPSHOT
After Visit Summary   5/25/2017    Kyle Silva    MRN: [id number]           Patient Information     Date Of Birth          1965        Visit Information        Provider Department      5/25/2017 11:00 AM You Mitchell MD [facility]        Today's Diagnoses     Type 2 diabetes mellitus without complication, without long-term current use of insulin (H)    -  1    Encounter for routine screening for malformation using ultrasonics        Acute gouty arthritis        Essential hypertension        Essential hypertension, benign        Seborrheic dermatitis of scalp          Care Instructions    BP cuff brands:  RELION and OMRON are good brands.    Please  the new blood pressure machine and start checking it 2 times a day, and write the number on a piece of paper and bring it in next time you come to see the doctor. (bring your new blood pressure to the doctor appointment so we can compare with ours)    Take this new medication (Diclofenac)  when you start to have foot pain    Stop taking Indomethacin          Gout Diet  Gout is a painful condition caused by an excess of uric acid, a waste product made by the body. Uric acid forms crystals that collect in the joints. This brings on symptoms of joint pain and swelling. This is called a gout attack. Often, medications and diet changes are combined to manage gout. Below are some guidelines for changing your diet to help you manage gout and prevent attacks. Your health care provider will help you determine the best eating plan for you.     Limiting or avoiding certain foods can help prevent gout attacks.   Eating to manage gout  Weight loss for those who are overweight may help reduce gout attacks.  Eat less of these foods  Eating too many foods containing purines may raise the levels of uric acid in your body. This raises your risk for a gout attack. Try to limit these foods and drinks:    Alcohol, such as beer and red wine. You may be  told to avoid alcohol completely.    Soft drinks that contain sugar or high fructose corn syrup    Certain fish, including anchovies, sardines, fish eggs, and herring    Certain meats, such as red meat, hot dogs, luncheon meats, and turkey    Organ meats, such as liver, kidneys, and sweetbreads    Legumes, such as dried beans and peas    Mushrooms, spinach, asparagus, and cauliflower    Other high fat foods such as gravy, whole milk, and high fat cheeses  Eat more of these foods  Other foods may be helpful for people with gout. Add some of these foods to your diet:    Dark berries, such as blueberries, blackberries, and cherries. These contain chemicals that may lower uric acid.    Tofu, a source of protein made from soy. Studies have shown that it may be a better choice than meat for people with gout.    Omega fatty acids. These are found in some fatty fish such as salmon, certain oils (flax, olive, or nut), and nuts themselves. Omega fatty acids may help prevent inflammation due to gout.    Dairy products that are low-fat or fat-free, such as cheese and yogurt    Complex carbohydrate foods, including whole grains, brown rice, oats, and beans    Coffee, in moderation  Follow-up care  Follow up with your health care provider, or as advised.  When to seek medical advice  Call your health care provider right away if any of these occur:    Return of gout symptoms, usually at night:    Severe pain, swelling, and heat in a joint, especially the base of the big toe    Affected joint is hard to move    Skin of the affected joint is purple or red    Fever of 100.4 F (38 C) or higher    Pain that doesn't get better even with prescribed medicine     4906-0430 [employer]. [address] [place] [address], [place] [address]. All rights reserved. This information is not intended as a substitute for professional medical care. Always follow your healthcare professional's instructions.                Follow-ups after your  "visit        Future tests that were ordered for you today     Open Future Orders        Priority Expected Expires Ordered    Fecal Occult Blood - FIT, iFOB ([facility]) Routine  2017            Who to contact     Please call your clinic at [phone] to:    Ask questions about your health    Make or cancel appointments    Discuss your medicines    Learn about your test results    Speak to your doctor   If you have compliments or concerns about an experience at your clinic, or if you wish to file a complaint, please contact [facility] Patient Relations at [phone] or email us at [email or web address]         Additional Information About Your Visit        MyChart Information     [employer] is an electronic gateway that provides easy, online access to your medical records. With [email or web address], you can request a clinic appointment, read your test results, renew a prescription or communicate with your care team.     To sign up for [employer]st visit the website at [email or web address]   You will be asked to enter the access code listed below, as well as some personal information. Please follow the directions to create your username and password.     Your access code is: [email or web address]  Expires: 2017 12:24 PM     Your access code will  in 90 days. If you need help or a new code, please contact your [facility] or call [phone] for assistance.        Care EveryWhere ID     This is your Care EveryWhere ID. This could be used by other organizations to access your [facility] medical records  [id number]        Your Vitals Were     Pulse Temperature Height BMI (Body Mass Index)          74 97.9  F (36.6  C) (Oral) 5' 4\" (162.6 cm) 33.99 kg/m2         Blood Pressure from Last 3 Encounters:   17 (!) 171/105   17 (!) 194/116   17 (!) 145/101    Weight from Last 3 Encounters:   17 198 lb (89.8 kg)   17 198 lb 6.4 oz " (90 kg)   01/20/17 198 lb (89.8 kg)              We Performed the Following     Basic Metabolic Panel ([place])     Microalbumin Creatinine Ratio Random Ur ([facility])          Today's Medication Changes          These changes are accurate as of: 5/25/17 12:35 PM.  If you have any questions, ask your nurse or doctor.               Start taking these medicines.        Dose/Directions    amLODIPine 5 MG tablet   Commonly known as:  NORVASC   Used for:  Essential hypertension, benign   Started by:  You Mitchell MD        Dose:  5 mg   Take 1 tablet (5 mg) by mouth daily At bedtime   Quantity:  30 tablet   Refills:  1       diclofenac 50 MG EC tablet   Commonly known as:  VOLTAREN   Used for:  Acute gouty arthritis   Started by:  You Mitchell MD        Dose:  50 mg   Take 1 tablet (50 mg) by mouth 3 times daily as needed for moderate pain (for gout.  take only as needed)   Quantity:  30 tablet   Refills:  1       halcinonide 0.1 % Crea cream   Commonly known as:  HALOG   Used for:  Seborrheic dermatitis of scalp   Started by:  You Mitchell MD        Apply topically 2 times daily   Quantity:  60 g   Refills:  1       order for DME   Used for:  Essential hypertension   Started by:  You Mitchell MD        1 BP cuff.  Bicep cuff only (NOT WRIST).  Large size   Quantity:  1 Device   Refills:  0         These medicines have changed or have updated prescriptions.        Dose/Directions    triamcinolone 0.1 % lotion   Commonly known as:  KENALOG   This may have changed:  Another medication with the same name was removed. Continue taking this medication, and follow the directions you see here.   Used for:  Rash   Changed by:  You Mitchell MD        Apply topically 2 times daily As needed to scalp   Quantity:  60 mL   Refills:  1         Stop taking these medicines if you haven't already. Please contact your care team if you have questions.     indomethacin 25 MG capsule   Commonly  known as:  INDOCIN   Stopped by:  You Mitchell MD                Where to get your medicines      These medications were sent to [id number] - [place], [place] - [address] AT [address]  [address], [place] [address]     Phone:  [phone]     amLODIPine 5 MG tablet    diclofenac 50 MG EC tablet    halcinonide 0.1 % Crea cream    losartan-hydrochlorothiazide 100-25 MG per tablet         Some of these will need a paper prescription and others can be bought over the counter.  Ask your nurse if you have questions.     Bring a paper prescription for each of these medications     order for DME                Primary Care Provider Office Phone # Fax #    You Mitchell [phone]       [facility] [place] [address] [facility] [address]        Thank you!     Thank you for choosing [facility]  for your care. Our goal is always to provide you with excellent care. Hearing back from our patients is one way we can continue to improve our services. Please take a few minutes to complete the written survey that you may receive in the mail after your visit with us. Thank you!             Your Updated Medication List - Protect others around you: Learn how to safely use, store and throw away your medicines at www.disposemymeds.org.          This list is accurate as of: 5/25/17 12:35 PM.  Always use your most recent med list.                   Brand Name Dispense Instructions for use    amLODIPine 5 MG tablet    NORVASC    30 tablet    Take 1 tablet (5 mg) by mouth daily At bedtime       blood glucose lancets standard    no brand specified    1 Box    Use to test blood sugar 2 times daily or as directed.       blood glucose monitoring meter device kit    no brand specified    1 kit    Use to test blood sugar 2 times daily       blood glucose monitoring test strip    no brand specified    100 strip    Use to test blood sugars 2 times daily or  as directed       Blood Pressure Monitor Kit      Use as directed       diclofenac 50 MG EC tablet    VOLTAREN    30 tablet    Take 1 tablet (50 mg) by mouth 3 times daily as needed for moderate pain (for gout.  take only as needed)       halcinonide 0.1 % Crea cream    HALOG    60 g    Apply topically 2 times daily       losartan-hydrochlorothiazide 100-25 MG per tablet    HYZAAR    30 tablet    Take 1 tablet by mouth daily       metFORMIN 500 MG tablet    GLUCOPHAGE    120 tablet    1 po q supper for 2 weeks, then 1 po bid for 2 weeks, then 1 po q am and 2 po q supper for 2 weeks, then 2 po bid       order for DME     1 Device    1 BP cuff.  Bicep cuff only (NOT WRIST).  Large size       triamcinolone 0.1 % lotion    KENALOG    60 mL    Apply topically 2 times daily As needed to scalp       TYLENOL 8 HOUR PO

## 2017-05-25 NOTE — PATIENT INSTRUCTIONS
BP cuff brands:  RELION and OMRON are good brands.    Please  the new blood pressure machine and start checking it 2 times a day, and write the number on a piece of paper and bring it in next time you come to see the doctor. (bring your new blood pressure to the doctor appointment so we can compare with ours)    Take this new medication (Diclofenac)  when you start to have foot pain    Stop taking Indomethacin          Gout Diet  Gout is a painful condition caused by an excess of uric acid, a waste product made by the body. Uric acid forms crystals that collect in the joints. This brings on symptoms of joint pain and swelling. This is called a gout attack. Often, medications and diet changes are combined to manage gout. Below are some guidelines for changing your diet to help you manage gout and prevent attacks. Your health care provider will help you determine the best eating plan for you.     Limiting or avoiding certain foods can help prevent gout attacks.   Eating to manage gout  Weight loss for those who are overweight may help reduce gout attacks.  Eat less of these foods  Eating too many foods containing purines may raise the levels of uric acid in your body. This raises your risk for a gout attack. Try to limit these foods and drinks:    Alcohol, such as beer and red wine. You may be told to avoid alcohol completely.    Soft drinks that contain sugar or high fructose corn syrup    Certain fish, including anchovies, sardines, fish eggs, and herring    Certain meats, such as red meat, hot dogs, luncheon meats, and turkey    Organ meats, such as liver, kidneys, and sweetbreads    Legumes, such as dried beans and peas    Mushrooms, spinach, asparagus, and cauliflower    Other high fat foods such as gravy, whole milk, and high fat cheeses  Eat more of these foods  Other foods may be helpful for people with gout. Add some of these foods to your diet:    Dark berries, such as blueberries, blackberries, and  cherries. These contain chemicals that may lower uric acid.    Tofu, a source of protein made from soy. Studies have shown that it may be a better choice than meat for people with gout.    Omega fatty acids. These are found in some fatty fish such as salmon, certain oils (flax, olive, or nut), and nuts themselves. Omega fatty acids may help prevent inflammation due to gout.    Dairy products that are low-fat or fat-free, such as cheese and yogurt    Complex carbohydrate foods, including whole grains, brown rice, oats, and beans    Coffee, in moderation  Follow-up care  Follow up with your health care provider, or as advised.  When to seek medical advice  Call your health care provider right away if any of these occur:    Return of gout symptoms, usually at night:    Severe pain, swelling, and heat in a joint, especially the base of the big toe    Affected joint is hard to move    Skin of the affected joint is purple or red    Fever of 100.4 F (38 C) or higher    Pain that doesn't get better even with prescribed medicine     9451-1897 [employer]. [address] [place] [address], [place] [address]. All rights reserved. This information is not intended as a substitute for professional medical care. Always follow your healthcare professional's instructions.

## 2017-05-26 ASSESSMENT — PATIENT HEALTH QUESTIONNAIRE - PHQ9: SUM OF ALL RESPONSES TO PHQ QUESTIONS 1-9: 3

## 2017-05-29 NOTE — PROGRESS NOTES
Please call pt through his wife ([phone])  His blood tests were ok, but his kidneys leak protien, which is probably from diabetes.  Good blood sugar and blood pressure control are key to preventing future damage to them.  It's important that we continue to work hard on his BP, and he should follow-up in 2 weeks for a recheck of it.  ERIBERTO Mitchell

## 2017-07-11 ENCOUNTER — OFFICE VISIT (OUTPATIENT)
Dept: FAMILY MEDICINE | Facility: CLINIC | Age: 52
End: 2017-07-11

## 2017-07-11 VITALS
WEIGHT: 199 LBS | SYSTOLIC BLOOD PRESSURE: 146 MMHG | HEART RATE: 81 BPM | DIASTOLIC BLOOD PRESSURE: 89 MMHG | HEIGHT: 64 IN | TEMPERATURE: 98.2 F | BODY MASS INDEX: 33.97 KG/M2

## 2017-07-11 DIAGNOSIS — E11.9 TYPE 2 DIABETES MELLITUS WITHOUT COMPLICATION, WITHOUT LONG-TERM CURRENT USE OF INSULIN (H): ICD-10-CM

## 2017-07-11 DIAGNOSIS — R80.9 MICROALBUMINURIA: ICD-10-CM

## 2017-07-11 DIAGNOSIS — I10 ESSENTIAL HYPERTENSION, BENIGN: Primary | ICD-10-CM

## 2017-07-11 RX ORDER — LOSARTAN POTASSIUM AND HYDROCHLOROTHIAZIDE 25; 100 MG/1; MG/1
1 TABLET ORAL DAILY
Qty: 90 TABLET | Refills: 3 | Status: SHIPPED | OUTPATIENT
Start: 2017-07-11 | End: 2018-09-07

## 2017-07-11 RX ORDER — AMLODIPINE BESYLATE 5 MG/1
5 TABLET ORAL DAILY
Qty: 90 TABLET | Refills: 3 | Status: SHIPPED | OUTPATIENT
Start: 2017-07-11 | End: 2018-09-07

## 2017-07-11 NOTE — PROGRESS NOTES
ASSESSMENT and PLAN:   (I10) Essential hypertension, benign  (primary encounter diagnosis)  Comment: better control.  Home bps 130-140s/upper 80 to 100s.  Discussed that ideal would be under 140/90 consistently.  Recommended increasing norvasc to 10 mg daily at bedtime, but he'd prefer to wait and work on weight.  Agrees to change next month.  If not successful with amlodine would add spironolactone 25 mg daily.   Plan: losartan-hydrochlorothiazide (HYZAAR) 100-25 MG        per tablet, amLODIPine (NORVASC) 5 MG tablet        Refilled both    (E11.9) Type 2 diabetes mellitus without complication, without long-term current use of insulin (H)  Comment: met w/ RN today to discuss meal planning  Plan: repeat AIC in 2 months, at next f/up    (R80.9) Microalbuminuria  Comment: Cr normal.  Needs excellent blood sugar and bp control  Plan: pt thought meds caused this, but I explained that DM in context of htn is most likely cause, but renal fx a this time is normal.  Will recheck when he returns, looking for downward trend.        No orders of the defined types were placed in this encounter.    Medications Discontinued During This Encounter   Medication Reason     metFORMIN (GLUCOPHAGE) 500 MG tablet Stopped by Patient     losartan-hydrochlorothiazide (HYZAAR) 100-25 MG per tablet Reorder     amLODIPine (NORVASC) 5 MG tablet Reorder          CHIEF COMPLAINT:  Chief Complaint   Patient presents with     RECHECK     follow up protein        HISTORY OF PRESENT ILLNESS:  Kyle is a 52 year old male presenting to the clinic today, with his wife who is acting as his , for a follow up regarding hypertension and proteinuria. Proteinuria was present after screening for diabetes in May.  His most recent level is 96.  As a result, his losartan dose was increased.  He is now concerned that one of his medications is causing proteinuria.  He is a type 2 diabetic and is also hypertensive.    Hypertension:  He continues on  "losartan/hctz and amlodipine.  Hydrochlorothiazide has caused frequent urination in the past.  He brought in his blood pressure readings from home today.  He would like to remain on his current medication regimen, as opposed to increasing his dosage.  He is willing to diet and exercise as an alternative, although he admits he has had difficulty with this in the past.  He eats strawberries and bananas frequently.    Diabetes:  He believes his diabetes has been controlled.  As a result, he has not been taking metformin.  His May A1c was 7.6.       VITALS:  Vitals:    07/11/17 1535 07/11/17 1539   BP: (!) 193/97 146/89   Pulse: 81    Temp: 98.2  F (36.8  C)    TempSrc: Oral    Weight: 199 lb (90.3 kg)    Height: 5' 4\" (162.6 cm)      Wt Readings from Last 3 Encounters:   07/11/17 199 lb (90.3 kg)   05/25/17 198 lb (89.8 kg)   05/09/17 198 lb 6.4 oz (90 kg)     Body mass index is 34.16 kg/(m^2).     PHYSICAL EXAM:  Constitutional:  Reveals an alert, talkative, middle aged male.  He does not appear to be acutely ill.    Vital signs: reviewed and appropriate aside from obesity and hypertension.   Psychiatric:  Mood appropriate, memory intact.     MEDICATIONS:  Current Outpatient Prescriptions   Medication Sig Dispense Refill     losartan-hydrochlorothiazide (HYZAAR) 100-25 MG per tablet Take 1 tablet by mouth daily 90 tablet 3     amLODIPine (NORVASC) 5 MG tablet Take 1 tablet (5 mg) by mouth daily At bedtime 90 tablet 3     diclofenac (VOLTAREN) 50 MG EC tablet Take 1 tablet (50 mg) by mouth 3 times daily as needed for moderate pain (for gout.  take only as needed) 30 tablet 1     order for DME 1 BP cuff.  Bicep cuff only (NOT WRIST).  Large size 1 Device 0     halcinonide (HALOG) 0.1 % CREA cream Apply topically 2 times daily 60 g 1     [DISCONTINUED] amLODIPine (NORVASC) 5 MG tablet Take 1 tablet (5 mg) by mouth daily At bedtime 30 tablet 1     [DISCONTINUED] losartan-hydrochlorothiazide (HYZAAR) 100-25 MG per tablet " Take 1 tablet by mouth daily 30 tablet 1     blood glucose monitoring (NO BRAND SPECIFIED) meter device kit Use to test blood sugar 2 times daily 1 kit 0     blood glucose monitoring (NO BRAND SPECIFIED) test strip Use to test blood sugars 2 times daily or as directed 100 strip 11     blood glucose (NO BRAND SPECIFIED) lancets standard Use to test blood sugar 2 times daily or as directed. 1 Box 11     Acetaminophen (TYLENOL 8 HOUR PO)        triamcinolone (KENALOG) 0.1 % lotion Apply topically 2 times daily As needed to scalp 60 mL 1     Blood Pressure Monitor KIT Use as directed         ADDITIONAL HISTORY SUMMARIZED (2): None.  DECISION TO OBTAIN EXTRA INFORMATION (1): None.  RADIOLOGY TESTS (1): None.  LABS (1): May labs reveiwed.  MEDICINE TESTS (1): None.  INDEPENDENT REVIEW (2 each): None.     The visit lasted a total of 20 minutes face to face with the patient. Over 50% of the time was spent counseling and educating the patient about proteinuria and hypertension.      This note was scribed by Sergio Ruelas on behalf of You Mitchell MD on July 11, 2017 at 5:02 PM.     Sergio Ruleas acted as a scribe and the encounter documented above was performed completely by me.     Total data points: 1

## 2017-07-11 NOTE — MR AVS SNAPSHOT
After Visit Summary   2017    Kyle Silva    MRN: [id number]           Patient Information     Date Of Birth          1965        Visit Information        Provider Department      2017 3:30 PM You Mitchell MD [facility]        Today's Diagnoses     Essential hypertension, benign          Care Instructions    Follow-up in 2 months    Keep working on weight loss.    Check blood pressures 2 times per week and record them.          Follow-ups after your visit        Follow-up notes from your care team     Return in about 2 months (around 2017).      Who to contact     Please call your clinic at [phone] to:    Ask questions about your health    Make or cancel appointments    Discuss your medicines    Learn about your test results    Speak to your doctor   If you have compliments or concerns about an experience at your clinic, or if you wish to file a complaint, please contact [facility] Patient Relations at [phone] or email us at [email or web address]         Additional Information About Your Visit        MyChart Information     [employer] is an electronic gateway that provides easy, online access to your medical records. With [employer], you can request a clinic appointment, read your test results, renew a prescription or communicate with your care team.     To sign up for [employer] visit the website at [email or web address]   You will be asked to enter the access code listed below, as well as some personal information. Please follow the directions to create your username and password.     Your access code is: [email or web address]  Expires: 2017 12:24 PM     Your access code will  in 90 days. If you need help or a new code, please contact your [facility] or call [phone] for assistance.        Care EveryWhere ID     This is your Care EveryWhere ID. This could be used by other organizations to  "access your [facility] medical records  [id number]        Your Vitals Were     Pulse Temperature Height BMI (Body Mass Index)          81 98.2  F (36.8  C) (Oral) 5' 4\" (162.6 cm) 34.16 kg/m2         Blood Pressure from Last 3 Encounters:   07/11/17 146/89   05/25/17 (!) 171/105   05/09/17 (!) 194/116    Weight from Last 3 Encounters:   07/11/17 199 lb (90.3 kg)   05/25/17 198 lb (89.8 kg)   05/09/17 198 lb 6.4 oz (90 kg)              Today, you had the following     No orders found for display         Where to get your medicines      These medications were sent to [facility] - [place], [place] - [address]  [address], [place] [address]     Phone:  [phone]     amLODIPine 5 MG tablet    losartan-hydrochlorothiazide 100-25 MG per tablet          Primary Care Provider Office Phone # Fax #    You Mitchell [phone]       [place] [address]        Equal Access to Services     [facility] : Hadii aad ku hadasho Soomaali, waaxda luqadaha, qaybta kaalmada adeegyada, danny rodas hayana marian bill valencia . So [facility] [phone].    ATENCIÓN: Si habla español, tiene a orozco disposición servicios gratuitos de asistencia lingüística. Llame al [phone].    We comply with applicable federal civil rights laws and [place] laws. We do not discriminate on the basis of race, color, national origin, age, disability sex, sexual orientation or gender identity.            Thank you!     Thank you for choosing [facility]  for your care. Our goal is always to provide you with excellent care. Hearing back from our patients is one way we can continue to improve our services. Please take a few minutes to complete the written survey that you may receive in the mail after your visit with us. Thank you!             Your Updated Medication List - Protect others around you: Learn how to safely use, store and throw away your medicines at " www.disposemymeds.org.          This list is accurate as of: 7/11/17  5:02 PM.  Always use your most recent med list.                   Brand Name Dispense Instructions for use Diagnosis    amLODIPine 5 MG tablet    NORVASC    90 tablet    Take 1 tablet (5 mg) by mouth daily At bedtime    Essential hypertension, benign       blood glucose lancets standard    no brand specified    1 Box    Use to test blood sugar 2 times daily or as directed.    Type 2 diabetes mellitus without complication, without long-term current use of insulin (H)       blood glucose monitoring meter device kit    no brand specified    1 kit    Use to test blood sugar 2 times daily    Type 2 diabetes mellitus without complication, without long-term current use of insulin (H)       blood glucose monitoring test strip    no brand specified    100 strip    Use to test blood sugars 2 times daily or as directed    Type 2 diabetes mellitus without complication, without long-term current use of insulin (H)       Blood Pressure Monitor Kit      Use as directed        diclofenac 50 MG EC tablet    VOLTAREN    30 tablet    Take 1 tablet (50 mg) by mouth 3 times daily as needed for moderate pain (for gout.  take only as needed)    Acute gouty arthritis       halcinonide 0.1 % Crea cream    HALOG    60 g    Apply topically 2 times daily    Seborrheic dermatitis of scalp       losartan-hydrochlorothiazide 100-25 MG per tablet    HYZAAR    90 tablet    Take 1 tablet by mouth daily    Essential hypertension, benign       order for DME     1 Device    1 BP cuff.  Bicep cuff only (NOT WRIST).  Large size    Essential hypertension       triamcinolone 0.1 % lotion    KENALOG    60 mL    Apply topically 2 times daily As needed to scalp    Rash       TYLENOL 8 HOUR PO

## 2017-07-11 NOTE — PATIENT INSTRUCTIONS
Follow-up in 2 months    Keep working on weight loss.    Check blood pressures 2 times per week and record them.

## 2017-07-11 NOTE — NURSING NOTE
"Diabetic Education RN Note for Nutrition  Meeting lasted: 30 mins  Patient current diet:  Patient states that he does not usually have breakfast but if he does he may have a home made smoothie (apple, spinach, broccoli, blue berries, and celery). For lunch and dinner he eats a medium size bowl of rice and meat.  He states he works out daily (walks the treadmill for 20-30 mins, sauna and hot tub). States he can be at the gym for up to 2-3 hours. He eats out maybe 1-2 times per month and sometimes will eat leftovers that his kids bring home.  Discussed what is diabetes and the disease process. Highlighted the importance of eating regular meals. Discussed the benefits of increasing water intake. Discussed portion sizes. Discussed how to do foot care. Discussed avoiding all sugared drinks. Basic meal planning ideas handout was given to the patient and a guide to regular physical activity. (handouts were given to patient on these topics)  Pt verbalized understanding of education given today.  Advised patient to call the clinic with any questions or concerns.   Patient goals:  Patient's goal is to decrease his rice intake to 1 cup of rice per meal. He will start out by decrease one meal to 1 cup and eventually he will do both meals. He will also try to make sure he eats \"something\" for breakfast.     "

## 2017-07-11 NOTE — PROGRESS NOTES
"Chief Complaint   Patient presents with     RECHECK     follow up protein     .Blood pressure 146/89, pulse 81, temperature 98.2  F (36.8  C), temperature source Oral, height 5' 4\" (162.6 cm), weight 199 lb (90.3 kg).      "

## 2018-02-01 ENCOUNTER — OFFICE VISIT (OUTPATIENT)
Dept: FAMILY MEDICINE | Facility: CLINIC | Age: 53
End: 2018-02-01
Payer: COMMERCIAL

## 2018-02-01 VITALS
SYSTOLIC BLOOD PRESSURE: 130 MMHG | DIASTOLIC BLOOD PRESSURE: 89 MMHG | BODY MASS INDEX: 33.44 KG/M2 | WEIGHT: 194.8 LBS | RESPIRATION RATE: 20 BRPM | HEART RATE: 95 BPM | TEMPERATURE: 98 F | OXYGEN SATURATION: 96 %

## 2018-02-01 DIAGNOSIS — I10 ESSENTIAL HYPERTENSION, BENIGN: ICD-10-CM

## 2018-02-01 DIAGNOSIS — E78.5 HYPERLIPIDEMIA, UNSPECIFIED HYPERLIPIDEMIA TYPE: ICD-10-CM

## 2018-02-01 DIAGNOSIS — L40.9 PSORIASIS: ICD-10-CM

## 2018-02-01 DIAGNOSIS — E11.9 TYPE 2 DIABETES MELLITUS WITHOUT COMPLICATION, WITHOUT LONG-TERM CURRENT USE OF INSULIN (H): Primary | ICD-10-CM

## 2018-02-01 LAB
ANION GAP SERPL CALCULATED.3IONS-SCNC: 12 MMOL/L (ref 5–18)
BUN SERPL-MCNC: 16 MG/DL (ref 8–22)
CALCIUM SERPL-MCNC: 10.1 MG/DL (ref 8.5–10.5)
CHLORIDE SERPL-SCNC: 104 MMOL/L (ref 98–107)
CHOLEST SERPL-MCNC: 214 MG/DL
CO2 SERPL-SCNC: 26 MMOL/L (ref 22–31)
CREAT SERPL-MCNC: 0.96 MG/DL (ref 0.7–1.3)
FASTING?: ABNORMAL
GLUCOSE SERPL-MCNC: 177 MG/DL (ref 70–125)
HBA1C MFR BLD: 7.7 % (ref 4.1–5.7)
HDLC SERPL-MCNC: 38 MG/DL
LDLC SERPL CALC-MCNC: 142 MG/DL
POTASSIUM SERPL-SCNC: 4.3 MMOL/L (ref 3.5–5)
SODIUM SERPL-SCNC: 142 MMOL/L (ref 136–145)
TRIGL SERPL-MCNC: 168 MG/DL

## 2018-02-01 RX ORDER — ATORVASTATIN CALCIUM 10 MG/1
10 TABLET, FILM COATED ORAL DAILY
Qty: 90 TABLET | Refills: 3 | Status: SHIPPED | OUTPATIENT
Start: 2018-02-01 | End: 2019-10-01

## 2018-02-01 RX ORDER — FLUOCINONIDE 1 MG/G
CREAM TOPICAL DAILY
Qty: 60 G | Refills: 1 | Status: SHIPPED | OUTPATIENT
Start: 2018-02-01 | End: 2018-07-31

## 2018-02-01 RX ORDER — HALCINONIDE 1 MG/G
CREAM TOPICAL 2 TIMES DAILY
Qty: 60 G | Refills: 1 | COMMUNITY
Start: 2018-02-01 | End: 2018-02-01

## 2018-02-01 RX ORDER — METFORMIN HCL 500 MG
TABLET, EXTENDED RELEASE 24 HR ORAL
Qty: 180 TABLET | Refills: 3 | Status: SHIPPED | OUTPATIENT
Start: 2018-02-01 | End: 2020-01-17

## 2018-02-01 ASSESSMENT — ANXIETY QUESTIONNAIRES
IF YOU CHECKED OFF ANY PROBLEMS ON THIS QUESTIONNAIRE, HOW DIFFICULT HAVE THESE PROBLEMS MADE IT FOR YOU TO DO YOUR WORK, TAKE CARE OF THINGS AT HOME, OR GET ALONG WITH OTHER PEOPLE: SOMEWHAT DIFFICULT
1. FEELING NERVOUS, ANXIOUS, OR ON EDGE: SEVERAL DAYS
7. FEELING AFRAID AS IF SOMETHING AWFUL MIGHT HAPPEN: SEVERAL DAYS
3. WORRYING TOO MUCH ABOUT DIFFERENT THINGS: NOT AT ALL
6. BECOMING EASILY ANNOYED OR IRRITABLE: MORE THAN HALF THE DAYS
5. BEING SO RESTLESS THAT IT IS HARD TO SIT STILL: NOT AT ALL
2. NOT BEING ABLE TO STOP OR CONTROL WORRYING: SEVERAL DAYS
GAD7 TOTAL SCORE: 6

## 2018-02-01 ASSESSMENT — PATIENT HEALTH QUESTIONNAIRE - PHQ9: 5. POOR APPETITE OR OVEREATING: SEVERAL DAYS

## 2018-02-01 NOTE — PROGRESS NOTES
Medication Management Note                                                       Kayw was referred by  for pharmacy services for medication management    MEDICATION REVIEW:  Discussed all medication indications, dosage and effectiveness, adverse effects, and adherence with patient/caregiver.    Pt had meds with them: no  Pt had med list with them: no  Pt was knowledgeable about meds: yes  Medications set up by: no  Medications administered by someone else (e.g., LTCF): No  Pt uses a medication box or automated dispenser: no  Called pharmacy to obtain or clarify med list:  no  Called HHN or LTCF to obtain or clarify med list:  no    Medication Discrepancies  Medications on EMR med list that pt is NOT taking:  yes, APAP, diclofenac, halcinonide  Medications pt IS taking that are NOT on EMR med list (e.g., from specialist, hospital): none  OTC meds/ dietary supplements pt taking on own that are NOT on EMR med list:  none  Dosage listed differently than how patient is taking: none  Frequency listed differently than how patient is taking: none  Duplicate medication on list (two occurrences of the same medication):  none  TOTAL NUMBER OF MEDICATION DISCREPANCIES:  3    Subjective                                                       Patient reports the following problems or concerns with their medications:  none  Patient reports the following adverse reactions to medications:  none  Pt reports missing doses:  2 to 3 times per week  Additional subjective information (e.g., reason for visit, frequency of PRNs, reasons meds were D/C ed):    Doesn't check BG    Objective                                                       Patient Active Problem List   Diagnosis     Essential hypertension, benign     Intracranial hemorrhage (H)     Major depressive disorder, single episode     Obstructive sleep apnea     Health Care Home     S/P ACL reconstruction     Noncompliance with medication regimen     Type 2 diabetes  mellitus without complication, without long-term current use of insulin (H)     Hyperlipidemia, unspecified hyperlipidemia type     Proteinuria       Current Outpatient Prescriptions   Medication Sig Dispense Refill     losartan-hydrochlorothiazide (HYZAAR) 100-25 MG per tablet Take 1 tablet by mouth daily 90 tablet 3     amLODIPine (NORVASC) 5 MG tablet Take 1 tablet (5 mg) by mouth daily At bedtime 90 tablet 3     triamcinolone (KENALOG) 0.1 % lotion Apply topically 2 times daily As needed to scalp 60 mL 1     order for DME 1 BP cuff.  Bicep cuff only (NOT WRIST).  Large size 1 Device 0     blood glucose monitoring (NO BRAND SPECIFIED) meter device kit Use to test blood sugar 2 times daily 1 kit 0     blood glucose monitoring (NO BRAND SPECIFIED) test strip Use to test blood sugars 2 times daily or as directed 100 strip 11     blood glucose (NO BRAND SPECIFIED) lancets standard Use to test blood sugar 2 times daily or as directed. 1 Box 11     Blood Pressure Monitor KIT Use as directed         Social History   Substance Use Topics     Smoking status: Never Smoker     Smokeless tobacco: Never Used     Alcohol use No       Lab Results   Component Value Date    A1C 7.7 02/01/2018    A1C 7.6 05/09/2017    A1C 8.3 01/20/2017    A1C 5.9 12/18/2015    A1C 5.8 10/14/2014     Last Basic Metabolic Panel:  Lab Results   Component Value Date    .7 05/25/2017      Lab Results   Component Value Date    POTASSIUM 3.8 05/25/2017     Lab Results   Component Value Date    CHLORIDE 99.9 05/25/2017     Lab Results   Component Value Date    ALBIN 9.8 05/25/2017     Lab Results   Component Value Date    CO2 28.8 05/25/2017     Lab Results   Component Value Date    BUN 18.0 05/25/2017     Lab Results   Component Value Date    CR 0.8 05/25/2017     Lab Results   Component Value Date    .2 05/25/2017       BP Readings from Last 3 Encounters:   02/01/18 130/89   07/11/17 146/89   05/25/17 (!) 171/105       The 10-year ASCVD risk  score (Marky CLAUDIO Jr, et al., 2013) is: 12.9%    Values used to calculate the score:      Age: 52 years      Sex: Male      Is Non- : No      Diabetic: Yes      Tobacco smoker: No      Systolic Blood Pressure: 130 mmHg      Is BP treated: Yes      HDL Cholesterol: 44.8 mg/dL      Total Cholesterol: 234.6 mg/dL    PHQ-9 score:    PHQ-9 SCORE 5/25/2017   Total Score -   Total Score 3       Assessment                                                       T2DM -  uncontrolled     A1c of 7.7% is above goal of <7%. Patient stopped metformin on 7/2017 because he felt like his diabetes was improving and that he no longer needed it.     Patient doesn't like to take meds, so discussed that if he can tolerate 2g/day of metformin we can increase the strength to 1000 mg tablets.     Needs moderate intensity statin. Will start off on low intensity and increase at next visit    ASCVD risk of 16%. Would hold off on starting ASA given his hx of ICH.     HTN -  controlled     Second BP today of 130/89 is within the goal of < 140/90. Would increase amlodipine if above goal next visit.      Plan/Recommendations                                                       Updated medication list in the EMR; deleted meds patient no longer taking and added meds patient is now taking, and changed doses where there was a dose discrepancy.    All medications were reviewed and found to be indicated, effective, safe and convenient/ affordable unless drug therapy problem(s) was/were identified, as are described below.      Completed at this visit     T2DM    Start metformin 500 mg XR tablets. For the first week, take one tablet by mouth once a day with meals. For the second week, take two tablet by mouth once a day with meals. For the third week, take three tablets once a day meals. For the fourth week, take four tablets once a day with meals. Continue to take four tablets once a day until your next visit.    Discussed that  metformin will be continued even if his blood sugar improves    Start atorvastatin 10 mg once daily    HTN    Reinforced adherence      To be completed at a future visit  T2DM    Verify if taking 2g/day of metformin    Educate on checking BG    Increase atorvastatin dose to 20 mg once daily    Options for treatment and/or follow-up care were reviewed with the patient.  Kyaw was engaged and actively involved in the decision making process, verbalized understanding of the options discussed, and was satisfied with the final plan.    Follow-up                                                       Patient should follow up in 1 month with Dr. Mitchell/pharmacy.  Patient was provided with written instructions/medication list via AVS.     Dr. Mitchell was provided the recommendations above  in clinic today and Dr. Mitchell was available for supervision during this visit and is the authorizing prescriber for this visit through the pharmacist collaborative practice agreement.    Charito JimenezD Student      Drug therapy problems identified  1. Med: metformin - Indication - needs additional therapy - Resolution: Intiate Drug; resolved  2. Med: atorvastatin - Indication - needs additional therapy - Resolution: Intiate Drug; resolved     # of medical conditions addressed: 5  # of medications addressed: 10  # of medication discrepancies identified: 3  # of DTP identified: 2  Time spent: 15 minutes  Level of service: 3    The student acted as scribe and the encounter documented was completely performed by myself. I have reviewed and verified the student s documentation and found it to be correct and complete.  Cuca Yuan, Pharm.D.

## 2018-02-01 NOTE — MR AVS SNAPSHOT
After Visit Summary   2/1/2018    Kyaw Silva    MRN: [id number]           Patient Information     Date Of Birth          1965        Visit Information        Provider Department      2/1/2018 3:10 PM You Mitchell MD [facility]        Today's Diagnoses     Psoriasis    -  1    Type 2 diabetes mellitus without complication, without long-term current use of insulin (H)        Acute gouty arthritis          Care Instructions    1) Start taking metformin 500 mg (extended release) tablets. For the first week, take one tablet by mouth once a day with meals. For the second week, take two tablet by mouth once a day with meals. For the third week, take three tablets once a day meals. For the fourth week, take four tablets once a day with meals. You will continue to take four tablets once a day until your next visit.    2) Start taking atorvastatin 10 mg tablets. Take one tablet by mouth once daily          Follow-ups after your visit        Additional Services     DERMATOLOGY REFERRAL       Patient to stop at the [facility] Desk    Reason for Referral: scalp psoriasis       needed: No  Language: English    May leave message on voicemail: Yes    ([place] Only) Referral should be tracked (Yes/No)?                  Who to contact     Please call your clinic at [phone] to:    Ask questions about your health    Make or cancel appointments    Discuss your medicines    Learn about your test results    Speak to your doctor   If you have compliments or concerns about an experience at your clinic, or if you wish to file a complaint, please contact [facility] Patient Relations at [phone] or email us at [email or web address]         Additional Information About Your Visit        MyChart Information     [employer] is an electronic gateway that provides easy, online access to your medical records. With [employer], you can request a clinic appointment, read your  test results, renew a prescription or communicate with your care team.     To sign up for [employer] visit the website at [email or web address]   You will be asked to enter the access code listed below, as well as some personal information. Please follow the directions to create your username and password.     Your access code is: [email or web address]  Expires: 2018  4:21 PM     Your access code will  in 90 days. If you need help or a new code, please contact your [facility] or call [phone] for assistance.        Care EveryWhere ID     This is your Care EveryWhere ID. This could be used by other organizations to access your [place] medical records  [id number]        Your Vitals Were     Pulse Temperature Respirations Pulse Oximetry BMI (Body Mass Index)       95 98  F (36.7  C) (Oral) 20 96% 33.44 kg/m2        Blood Pressure from Last 3 Encounters:   18 130/89   17 146/89   17 (!) 171/105    Weight from Last 3 Encounters:   18 194 lb 12.8 oz (88.4 kg)   17 199 lb (90.3 kg)   17 198 lb (89.8 kg)              We Performed the Following     Basic Metabolic Profile ([facility])     DERMATOLOGY REFERRAL     Hemoglobin A1c ([facility])     Lipid [place] ([facility]) - Results > 1hr          Today's Medication Changes          These changes are accurate as of 18  4:38 PM.  If you have any questions, ask your nurse or doctor.               Start taking these medicines.        Dose/Directions    atorvastatin 10 MG tablet   Commonly known as:  LIPITOR   Used for:  Type 2 diabetes mellitus without complication, without long-term current use of insulin (H)   Started by:  You Mitchell MD        Dose:  10 mg   Take 1 tablet (10 mg) by mouth daily   Quantity:  90 tablet   Refills:  3       Coal Tar Extract 4 % Sham   Used for:  Psoriasis   Started by:  You Mitchell MD        Dose:  1 Dose   Externally apply 1 Dose topically daily To  scalp and rinse normally   Quantity:  1 Bottle   Refills:  11       Fluocinonide 0.1 % Crea   Used for:  Psoriasis   Started by:  You Mitchell MD        Externally apply topically daily   Quantity:  60 g   Refills:  1       metFORMIN 500 MG 24 hr tablet   Commonly known as:  GLUCOPHAGE-XR   Used for:  Type 2 diabetes mellitus without complication, without long-term current use of insulin (H)   Started by:  You Mitchell MD        1 po qd with supper for 2 weeks, then 2 po qd   Quantity:  180 tablet   Refills:  3         Stop taking these medicines if you haven't already. Please contact your care team if you have questions.     diclofenac 50 MG EC tablet   Commonly known as:  VOLTAREN   Stopped by:  You Mitchell MD           halcinonide 0.1 % Crea cream   Commonly known as:  HALOG   Stopped by:  You Mitchell MD                Where to get your medicines      These medications were sent to [id number] - [place], [place] - [address] AT [address], [place] [address]     Phone:  [phone]     atorvastatin 10 MG tablet    Coal Tar Extract 4 % Sham    Fluocinonide 0.1 % Crea    metFORMIN 500 MG 24 hr tablet                Primary Care Provider Office Phone # Fax #    You Mitchell [phone]       [address] [address]        Equal Access to Services     [employer] AH: Hadii dianne ku hadasho Soomaali, waaxda luqadaha, qaybta kaalmada adeegyada, waxay clark munroe aderoberto carlos joiner. So [facility] [phone].    ATENCIÓN: Si habla español, tiene a orozco disposición servicios gratuitos de asistencia lingüística. [place] al [phone].    We comply with applicable federal civil rights laws and [place] laws. We do not discriminate on the basis of race, color, national origin, age, disability, sex, sexual orientation, or gender identity.            Thank you!     Thank you  for choosing [facility]  for your care. Our goal is always to provide you with excellent care. Hearing back from our patients is one way we can continue to improve our services. Please take a few minutes to complete the written survey that you may receive in the mail after your visit with us. Thank you!             Your Updated Medication List - Protect others around you: Learn how to safely use, store and throw away your medicines at www.disposemymeds.org.          This list is accurate as of 2/1/18  4:38 PM.  Always use your most recent med list.                   Brand Name Dispense Instructions for use Diagnosis    amLODIPine 5 MG tablet    NORVASC    90 tablet    Take 1 tablet (5 mg) by mouth daily At bedtime    Essential hypertension, benign       atorvastatin 10 MG tablet    LIPITOR    90 tablet    Take 1 tablet (10 mg) by mouth daily    Type 2 diabetes mellitus without complication, without long-term current use of insulin (H)       blood glucose lancets standard    no brand specified    1 Box    Use to test blood sugar 2 times daily or as directed.    Type 2 diabetes mellitus without complication, without long-term current use of insulin (H)       blood glucose monitoring meter device kit    no brand specified    1 kit    Use to test blood sugar 2 times daily    Type 2 diabetes mellitus without complication, without long-term current use of insulin (H)       blood glucose monitoring test strip    no brand specified    100 strip    Use to test blood sugars 2 times daily or as directed    Type 2 diabetes mellitus without complication, without long-term current use of insulin (H)       Blood Pressure Monitor Kit      Use as directed        Coal Tar Extract 4 % Sham     1 Bottle    Externally apply 1 Dose topically daily To scalp and rinse normally    Psoriasis       Fluocinonide 0.1 % Crea     60 g    Externally apply topically daily    Psoriasis       losartan-hydrochlorothiazide 100-25 MG per tablet     HYZAAR    90 tablet    Take 1 tablet by mouth daily    Essential hypertension, benign       metFORMIN 500 MG 24 hr tablet    GLUCOPHAGE-XR    180 tablet    1 po qd with supper for 2 weeks, then 2 po qd    Type 2 diabetes mellitus without complication, without long-term current use of insulin (H)       order for DME     1 Device    1 BP cuff.  Bicep cuff only (NOT WRIST).  Large size    Essential hypertension       triamcinolone 0.1 % lotion    KENALOG    60 mL    Apply topically 2 times daily As needed to scalp    Rash

## 2018-02-01 NOTE — PATIENT INSTRUCTIONS
1) Start taking metformin 500 mg (extended release) tablets. For the first week, take one tablet by mouth once a day with meals. For the second week, take two tablet by mouth once a day with meals. For the third week, take three tablets once a day meals. For the fourth week, take four tablets once a day with meals. You will continue to take four tablets once a day until your next visit.    2) Start taking atorvastatin 10 mg tablets. Take one tablet by mouth once daily    [facility]   [place] [facility]  [address] [place] [address]  Office: [phone]  Fax:     [phone]    Appointment  Date:2/9/18  Time: 1:15pm arrival    Please contact the above clinic if you need to cancel or reschedule. Feel free to contact me with any questions. Thanks!    Francine  Care Coordinator  [phone]    Spoke with wife about this.

## 2018-02-02 ASSESSMENT — PATIENT HEALTH QUESTIONNAIRE - PHQ9: SUM OF ALL RESPONSES TO PHQ QUESTIONS 1-9: 10

## 2018-02-02 ASSESSMENT — ANXIETY QUESTIONNAIRES: GAD7 TOTAL SCORE: 6

## 2018-02-02 NOTE — PROGRESS NOTES
Patient Active Problem List    Diagnosis Date Noted     Proteinuria 05/25/2017     Priority: Medium     Type 2 diabetes mellitus without complication, without long-term current use of insulin (H) 01/23/2017     Priority: Medium     Hyperlipidemia, unspecified hyperlipidemia type 01/23/2017     Priority: Medium     Noncompliance with medication regimen 12/03/2015     Priority: Medium     S/P ACL reconstruction 12/11/2014     Priority: Medium     Dr Mckeon @ [place]  Hamstring autograft  Parital medial and lateral meniscectomy       Essential hypertension, benign 03/21/2013     Priority: Medium     Intracranial hemorrhage (H) 03/21/2013     Priority: Medium     Has seen Dr Justina Whitaker @ [facility]  Problem list name updated by automated process. Provider to review       Major depressive disorder, single episode 03/21/2013     Priority: Medium     Problem list name updated by automated process. Provider to review       Obstructive sleep apnea 03/21/2013     Priority: Medium     Problem list name updated by automated process. Provider to review       Health Care Home 03/21/2013     Priority: Medium     Tier Level: 1  DX V65.8 REPLACED WITH 50390 HEALTH CARE HOME (04/08/2013)       There are no exam notes on file for this visit.  Chief Complaint   Patient presents with     RECHECK     Follow up - stroke     Derm Problem     Rash on the left head area, been there for a while now.  Was prescribed a cream but seems like its not working.  Patient would like a refferal to derematologist      Blood pressure 130/89, pulse 95, temperature 98  F (36.7  C), temperature source Oral, resp. rate 20, weight 194 lb 12.8 oz (88.4 kg), SpO2 96 %.     SUBJECTIVE:  Kyaw Silva is here for several issues.  He is here with his wife today.  He has Type II diabetes mellitus.  He is trying to exercise more and has lost a little bit of weight.  He tolerates his current meds well, which are primarily to treat his hypertension.  He doesn't  have any chest pain, SOB, polydipsia, or polyuria.     He has a rash on his scalp.  He has tried multiple OTC medications.  Some of them are either Hmong or Chinese and contained betamethasone [on bottle label].  These have helped temporarily.  The medication that I prescribed in the past hasn't helped much at all or was too expensive.  He would like to see a dermatologist if at all possible.   OBJECTIVE:     SKIN:  Patient has scattered plaque-like lesions on his scalp and large patches over the left ear, with scaly, well-defined edges.  He has two on the right temple and one on the vertex, which are consistent with psoriasis.     GENERAL:  His affect is appropriate.   CHEST:  Clear.   HEART:  Regular rate and rhythm.  Normal S1 and S2.  No murmur.   EXTREMITIES:  No edema.   LABS:  His A1C 7.7, which is trending upward.  His BMP is otherwise normal.  His lipids are abnormal.  His blood pressure is controlled.   ASSESSMENT/PLAN:   1.  Type II diabetes mellitus.  AIC is not horrible at this point, but not ideally controlled.  He is working on losing weight.  I think metformin is a good addition at this point.  We'll take the XR preparation and 500 mg with supper x2 weeks, then 1000 mg with supper.  We'll recheck an A1C in three months.   2.  Hypertension.  It is reasonably controlled.     3.  Dyslipidemia.  We're adding statin therapy.  We'll recheck lipid profile next time.      4.  Psoriasis.  I did prescribe a topical steroid for him and referred him to Dermatology.  I also prescribed a 4% coal tar shampoo that he can use daily as well.  Follow up in three months.  We'll mail results of labs.         Results for orders placed or performed in visit on 02/01/18   Hemoglobin A1c ([facility])   Result Value Ref Range    Hemoglobin A1C 7.7 (H) 4.1 - 5.7 %   Basic Metabolic Profile ([facility])   Result Value Ref Range    Sodium 142 136 - 145 mmol/L    Potassium 4.3 3.5 - 5.0 mmol/L    Chloride 104 98 - 107 mmol/L    CO2,  Total 26 22 - 31 mmol/L    Anion Gap 12 5 - 18 mmol/L    Glucose 177 (H) 70 - 125 mg/dL    Calcium 10.1 8.5 - 10.5 mg/dL    Urea Nitrogen 16 8 - 22 mg/dL    Creatinine 0.96 0.70 - 1.30 mg/dL    GFR Estimate If Black >60 >60 mL/min/1.73m2    GFR Estimate >60 >60 mL/min/1.73m2    Narrative    Test performed by:  [facility]  [address] [place] [address]  Fasting Glucose reference range is 70-99 mg/dL per  American Diabetes Association (ADA) guidelines.   Lipid [place] ([facility]) - Results > 1hr   Result Value Ref Range    Cholesterol 214 (H) <=199 mg/dL    Triglycerides 168 (H) <=149 mg/dL    HDL Cholesterol 38 (L) >=40 mg/dL    LDL Cholesterol Calculated 142 (H) <=129 mg/dL    Fasting? Unknown     Narrative    Test performed by:  [facility] [address] [place] [address]

## 2018-02-04 NOTE — PROGRESS NOTES
Please call pt and his wife.  They speak English well, but may communicate better in Hmong.  His cholesterol test is still high, but better than in the past, probably due to his healthier eating.  I still feel strongly that he should take the cholesterol medication, atorvastatin, daily as prescribed, and we'll recheck it in 3 months.  His blood kidney tests are excellent.  ERIBERTO Mitchell

## 2018-02-23 ENCOUNTER — TRANSFERRED RECORDS (OUTPATIENT)
Dept: HEALTH INFORMATION MANAGEMENT | Facility: CLINIC | Age: 53
End: 2018-02-23

## 2018-07-31 ENCOUNTER — OFFICE VISIT (OUTPATIENT)
Dept: FAMILY MEDICINE | Facility: CLINIC | Age: 53
End: 2018-07-31
Payer: COMMERCIAL

## 2018-07-31 VITALS
HEART RATE: 95 BPM | RESPIRATION RATE: 16 BRPM | WEIGHT: 192.2 LBS | HEIGHT: 64 IN | DIASTOLIC BLOOD PRESSURE: 93 MMHG | SYSTOLIC BLOOD PRESSURE: 156 MMHG | TEMPERATURE: 98.3 F | BODY MASS INDEX: 32.81 KG/M2 | OXYGEN SATURATION: 96 %

## 2018-07-31 DIAGNOSIS — E11.9 TYPE 2 DIABETES MELLITUS WITHOUT COMPLICATION, WITHOUT LONG-TERM CURRENT USE OF INSULIN (H): ICD-10-CM

## 2018-07-31 DIAGNOSIS — L40.9 PSORIASIS: ICD-10-CM

## 2018-07-31 DIAGNOSIS — I10 ESSENTIAL HYPERTENSION, BENIGN: Primary | ICD-10-CM

## 2018-07-31 DIAGNOSIS — I61.9 STROKE, HEMORRHAGIC (H): ICD-10-CM

## 2018-07-31 LAB
ANION GAP SERPL CALCULATED.3IONS-SCNC: 11 MMOL/L (ref 5–18)
BUN SERPL-MCNC: 13 MG/DL (ref 8–22)
CALCIUM SERPL-MCNC: 10.3 MG/DL (ref 8.5–10.5)
CHLORIDE SERPL-SCNC: 101 MMOL/L (ref 98–107)
CHOLEST SERPL-MCNC: 218 MG/DL
CO2 SERPL-SCNC: 27 MMOL/L (ref 22–31)
CREAT SERPL-MCNC: 1.07 MG/DL (ref 0.7–1.3)
FASTING?: ABNORMAL
GLUCOSE SERPL-MCNC: 167 MG/DL (ref 70–125)
HBA1C MFR BLD: 7.4 % (ref 4.1–5.7)
HDLC SERPL-MCNC: 35 MG/DL
LDLC SERPL CALC-MCNC: 120 MG/DL
POTASSIUM SERPL-SCNC: 3.9 MMOL/L (ref 3.5–5)
SODIUM SERPL-SCNC: 139 MMOL/L (ref 136–145)
TRIGL SERPL-MCNC: 317 MG/DL

## 2018-07-31 RX ORDER — FLUOCINONIDE 1 MG/G
CREAM TOPICAL DAILY
Qty: 60 G | Refills: 3 | Status: SHIPPED | OUTPATIENT
Start: 2018-07-31 | End: 2019-05-02

## 2018-07-31 NOTE — LETTER
August 2, 2018      Kyaw Silva  [address] [place] [id number]        Dear Kyaw,    Your blood kidney tests are normal.   Your cholesterol is controlled--continue taking the atorvastatin every day.   Your AIC test shows that your diabetes control is ok (under 8.0 is pretty good).   No changes are needed.   We should recheck the AIC test, for your diabetes, in 3 months.   Thanks for your trust.   Ritchie Mitchell MD     Please see below for your test results.    Resulted Orders   Hemoglobin A1c ([facility])   Result Value Ref Range    Hemoglobin A1C 7.4 (H) 4.1 - 5.7 %   Basic Metabolic Profile ([facility])   Result Value Ref Range    Sodium 139 136 - 145 mmol/L    Potassium 3.9 3.5 - 5.0 mmol/L    Chloride 101 98 - 107 mmol/L    CO2, Total 27 22 - 31 mmol/L    Anion Gap 11 5 - 18 mmol/L    Glucose 167 (H) 70 - 125 mg/dL    Calcium 10.3 8.5 - 10.5 mg/dL    Urea Nitrogen 13 8 - 22 mg/dL    Creatinine 1.07 0.70 - 1.30 mg/dL    GFR Estimate If Black >60 >60 mL/min/1.73m2    GFR Estimate >60 >60 mL/min/1.73m2    Narrative    Test performed by:  [facility]  [address] [place] [address]  Fasting Glucose reference range is 70-99 mg/dL per  American Diabetes Association (ADA) guidelines.   Lipid [place] ([facility]) - Results > 1hr   Result Value Ref Range    Cholesterol 218 (H) <=199 mg/dL    Triglycerides 317 (H) <=149 mg/dL    HDL Cholesterol 35 (L) >=40 mg/dL    LDL Cholesterol Calculated 120 <=129 mg/dL    Fasting? Unknown     Narrative    Test performed by:  [facility]  [address] [place] [address]       If you have any questions, please call the clinic to make an appointment.    Sincerely,    You Mitchell MD

## 2018-07-31 NOTE — MR AVS SNAPSHOT
"              After Visit Summary   2018    Kyaw Silva    MRN: [id number]           Patient Information     Date Of Birth          1965        Visit Information        Provider Department      2018 3:30 PM You Mitchell MD [facility]        Today's Diagnoses     Essential hypertension, benign    -  1    Psoriasis        Type 2 diabetes mellitus without complication, without long-term current use of insulin (H)          Care Instructions    Try the TheFormTool agueda to check medication prices.          Follow-ups after your visit        Who to contact     Please call your clinic at [phone] to:    Ask questions about your health    Make or cancel appointments    Discuss your medicines    Learn about your test results    Speak to your doctor            Additional Information About Your Visit        MyChart Information     [employer] is an electronic gateway that provides easy, online access to your medical records. With Eponym, you can request a clinic appointment, read your test results, renew a prescription or communicate with your care team.     To sign up for [employer] visit the website at [email or web address]   You will be asked to enter the access code listed below, as well as some personal information. Please follow the directions to create your username and password.     Your access code is: [email or web address]  Expires: 10/29/2018  4:26 PM     Your access code will  in 90 days. If you need help or a new code, please contact your [facility] or call [phone] for assistance.        Care EveryWhere ID     This is your Care EveryWhere ID. This could be used by other organizations to access your [facility] medical records  [id number]        Your Vitals Were     Pulse Temperature Respirations Height Pulse Oximetry BMI (Body Mass Index)    95 98.3  F (36.8  C) (Oral) 16 5' 3.62\" (161.6 cm) 96% 33.38 kg/m2       Blood Pressure from Last 3 Encounters: "   07/31/18 (!) 156/93   02/01/18 130/89   07/11/17 146/89    Weight from Last 3 Encounters:   07/31/18 192 lb 3.2 oz (87.2 kg)   02/01/18 194 lb 12.8 oz (88.4 kg)   07/11/17 199 lb (90.3 kg)              We Performed the Following     Basic Metabolic Panel ([place])     Hemoglobin A1c ([facility])     Lipid Panel ([place])          Today's Medication Changes          These changes are accurate as of 7/31/18  4:26 PM.  If you have any questions, ask your nurse or doctor.               These medicines have changed or have updated prescriptions.        Dose/Directions    Fluocinonide 0.1 % cream   Commonly known as:  LIDEX   This may have changed:    - how to take this  - additional instructions   Used for:  Psoriasis   Changed by:  You Mitchell MD        Apply topically daily As needed   Quantity:  60 g   Refills:  3            Where to get your medicines      Some of these will need a paper prescription and others can be bought over the counter.  Ask your nurse if you have questions.     Bring a paper prescription for each of these medications     Fluocinonide 0.1 % cream                Primary Care Provider Office Phone # Fax #    You Mitchell [phone]       [address] [place] [address]        Equal Access to Services     [employer] AH: Shabbir russello Sodarlene, waaxda luqadaha, qaybta kaalmada adeegyada, danny joiner. So [facility] [phone].    ATENCIÓN: Si habla español, tiene a orozco disposición servicios gratuitos de asistencia lingüística. Llame al [phone].    We comply with applicable federal civil rights laws and [place] laws. We do not discriminate on the basis of race, color, national origin, age, disability, sex, sexual orientation, or gender identity.            Thank you!     Thank you for choosing [facility]  for your care. Our goal is always to provide you with excellent care. Hearing back from our patients is one way we  can continue to improve our services. Please take a few minutes to complete the written survey that you may receive in the mail after your visit with us. Thank you!             Your Updated Medication List - Protect others around you: Learn how to safely use, store and throw away your medicines at www.disposemymeds.org.          This list is accurate as of 7/31/18  4:26 PM.  Always use your most recent med list.                   Brand Name Dispense Instructions for use Diagnosis    amLODIPine 5 MG tablet    NORVASC    90 tablet    Take 1 tablet (5 mg) by mouth daily At bedtime    Essential hypertension, benign       atorvastatin 10 MG tablet    LIPITOR    90 tablet    Take 1 tablet (10 mg) by mouth daily    Type 2 diabetes mellitus without complication, without long-term current use of insulin (H)       blood glucose lancets standard    no brand specified    1 Box    Use to test blood sugar 2 times daily or as directed.    Type 2 diabetes mellitus without complication, without long-term current use of insulin (H)       blood glucose monitoring meter device kit    no brand specified    1 kit    Use to test blood sugar 2 times daily    Type 2 diabetes mellitus without complication, without long-term current use of insulin (H)       blood glucose monitoring test strip    no brand specified    100 strip    Use to test blood sugars 2 times daily or as directed    Type 2 diabetes mellitus without complication, without long-term current use of insulin (H)       Blood Pressure Monitor Kit      Use as directed        Coal Tar Extract 4 % Sham     1 Bottle    Externally apply 1 Dose topically daily To scalp and rinse normally    Psoriasis       Fluocinonide 0.1 % cream    LIDEX    60 g    Apply topically daily As needed    Psoriasis       losartan-hydrochlorothiazide 100-25 MG per tablet    HYZAAR    90 tablet    Take 1 tablet by mouth daily    Essential hypertension, benign       metFORMIN 500 MG 24 hr tablet     GLUCOPHAGE-XR    180 tablet    1 po qd with supper for 2 weeks, then 2 po qd    Type 2 diabetes mellitus without complication, without long-term current use of insulin (H)       order for DME     1 Device    1 BP cuff.  Bicep cuff only (NOT WRIST).  Large size    Essential hypertension       triamcinolone 0.1 % lotion    KENALOG    60 mL    Apply topically 2 times daily As needed to scalp    Rash

## 2018-08-02 PROBLEM — I61.9 STROKE, HEMORRHAGIC (H): Status: ACTIVE | Noted: 2018-08-02

## 2018-08-02 NOTE — PROGRESS NOTES
"Patient Active Problem List    Diagnosis Date Noted     Proteinuria 05/25/2017     Priority: Medium     Type 2 diabetes mellitus without complication, without long-term current use of insulin (H) 01/23/2017     Priority: Medium     Hyperlipidemia, unspecified hyperlipidemia type 01/23/2017     Priority: Medium     Noncompliance with medication regimen 12/03/2015     Priority: Medium     S/P ACL reconstruction 12/11/2014     Priority: Medium     Dr Mckeon @ [place]  Hamstring autograft  Parital medial and lateral meniscectomy       Essential hypertension, benign 03/21/2013     Priority: Medium     Intracranial hemorrhage (H) 03/21/2013     Priority: Medium     Has seen Dr Justina Whitaker @ [facility]  Problem list name updated by automated process. Provider to review       Major depressive disorder, single episode 03/21/2013     Priority: Medium     Problem list name updated by automated process. Provider to review       Obstructive sleep apnea 03/21/2013     Priority: Medium     Problem list name updated by automated process. Provider to review       Health Care Home 03/21/2013     Priority: Medium     Tier Level: 1  DX V65.8 REPLACED WITH 03974 HEALTH CARE HOME (04/08/2013)       There are no exam notes on file for this visit.  Chief Complaint   Patient presents with     Forms     paperwork     Refill Request     Refill medications     Blood pressure (!) 156/93, pulse 95, temperature 98.3  F (36.8  C), temperature source Oral, resp. rate 16, height 5' 3.62\" (161.6 cm), weight 192 lb 3.2 oz (87.2 kg), SpO2 96 %.   SUBJECTIVE:  Kyaw Silva is here with his wife today for several concerns.  First of all, he would like refill of the cream that was successful for his psoriasis (fluocinonide).  He says that it is extremely expensive and he would like to consider alternatives as well.  Secondly, he needs followup of diabetes.  He is on metformin and seems to be adherent.  He doesn't monitor his blood sugars and has no " symptoms.  Lastly, he needs a form completed.  He has chronic disability as a result of his left hemispheric hemorrhagic stroke in the past.  He has residual word finding difficulties, numbness on the right side of his body, and some mild weakness in his right hand.  Please see copied form for details about that.   OBJECTIVE:   GENERAL:  Patient is alert, pleasant, and in no acute distress.     SKIN:  He does have some mild erythema with slight scale just above his ear and on his scalp.   CHEST:  Clear.   HEART:  Regular rate and rhythm.  Normal S1 and S2.  No murmur.   EXTREMITIES:  No edema.  He does have some diminished sensation on his entire left side to light touch.     His speech is in Hmong.  It was difficult for us to understand due to his difficulty with articulation.  He has some slight word finding difficulties and some delay that is ?? as well.   ASSESSMENT/PLAN:   1.  Type II diabetes mellitus.   I checked A1C today: 7.4.  He has reasonable control.  I'll continue current dose of metformin.  His creatinine is normal today.  His lipids are better than baseline, but we may need to consider high-intensity statin in the future, based on his ASCVD risk next time.     2.  Psoriasis, I did look up the cost of fluocinonide in various stores and it should be somewhere around $40- $50 a month.  I gave them a recommendation to use the Good Rx agueda to some price checking, and I printed prescription that they can use on a p.r.n. basis.     3.  History of hemorrhagic stroke.  Form was completed.     4.  Hypertension.  He isn't at goal.  I encouraged adherence.  Follow up in three months.         I called w/ labs to consider changing to amlodipine/atorvastatin combo 10/40 in order to more aggressively tx his BP/lipids.  Discussed w/ his wife, who stated he would prefer to stay as is.  He Is taking amlodipine 5 mg at night to deal with the dizziness it caused him when taking it in the morning.    The 10-year ASCVD risk  score (Marky CLAUDIO Jr, et al., 2013) is: 21.5%    Values used to calculate the score:      Age: 53 years      Sex: Male      Is Non- : No      Diabetic: Yes      Tobacco smoker: No      Systolic Blood Pressure: 156 mmHg      Is BP treated: Yes      HDL Cholesterol: 35 mg/dL      Total Cholesterol: 218 mg/dL       Results for orders placed or performed in visit on 07/31/18   Hemoglobin A1c ([facility])   Result Value Ref Range    Hemoglobin A1C 7.4 (H) 4.1 - 5.7 %   Basic Metabolic Profile ([facility])   Result Value Ref Range    Sodium 139 136 - 145 mmol/L    Potassium 3.9 3.5 - 5.0 mmol/L    Chloride 101 98 - 107 mmol/L    CO2, Total 27 22 - 31 mmol/L    Anion Gap 11 5 - 18 mmol/L    Glucose 167 (H) 70 - 125 mg/dL    Calcium 10.3 8.5 - 10.5 mg/dL    Urea Nitrogen 13 8 - 22 mg/dL    Creatinine 1.07 0.70 - 1.30 mg/dL    GFR Estimate If Black >60 >60 mL/min/1.73m2    GFR Estimate >60 >60 mL/min/1.73m2    Narrative    Test performed by:  [facility]  [address] [place] [address]  Fasting Glucose reference range is 70-99 mg/dL per  American Diabetes Association (ADA) guidelines.   Lipid [place] ([facility]) - Results > 1hr   Result Value Ref Range    Cholesterol 218 (H) <=199 mg/dL    Triglycerides 317 (H) <=149 mg/dL    HDL Cholesterol 35 (L) >=40 mg/dL    LDL Cholesterol Calculated 120 <=129 mg/dL    Fasting? Unknown     Narrative    Test performed by:  [facility]  [address] [place] [address]

## 2018-08-02 NOTE — PROGRESS NOTES
Please mail labs to patient with this message.    Mr Silva  Your blood kidney tests are normal.  Your cholesterol is controlled--continue taking the atorvastatin every day.  Your AIC test shows that your diabetes control is ok (under 8.0 is pretty good).  No changes are needed.  We should recheck the AIC test, for your diabetes, in 3 months.  Thanks for your trust.  Ritchie Mitchell MD

## 2018-09-07 ENCOUNTER — TELEPHONE (OUTPATIENT)
Dept: FAMILY MEDICINE | Facility: CLINIC | Age: 53
End: 2018-09-07

## 2018-09-07 DIAGNOSIS — I10 ESSENTIAL HYPERTENSION, BENIGN: ICD-10-CM

## 2018-09-07 RX ORDER — LOSARTAN POTASSIUM AND HYDROCHLOROTHIAZIDE 25; 100 MG/1; MG/1
1 TABLET ORAL DAILY
Qty: 90 TABLET | Refills: 3 | Status: CANCELLED | OUTPATIENT
Start: 2018-09-07

## 2018-09-07 RX ORDER — AMLODIPINE BESYLATE 5 MG/1
5 TABLET ORAL DAILY
Qty: 90 TABLET | Refills: 3 | Status: CANCELLED | OUTPATIENT
Start: 2018-09-07

## 2018-09-07 RX ORDER — AMLODIPINE BESYLATE 5 MG/1
5 TABLET ORAL DAILY
Qty: 90 TABLET | Refills: 3 | Status: SHIPPED | OUTPATIENT
Start: 2018-09-07 | End: 2018-09-14

## 2018-09-07 RX ORDER — LOSARTAN POTASSIUM AND HYDROCHLOROTHIAZIDE 25; 100 MG/1; MG/1
1 TABLET ORAL DAILY
Qty: 90 TABLET | Refills: 3 | Status: SHIPPED | OUTPATIENT
Start: 2018-09-07 | End: 2018-09-11

## 2018-09-07 NOTE — TELEPHONE ENCOUNTER
[facility] phone call message- patient requesting a refill:    Full Medication Name: Amlodipine and Losartan     Dose: 5mg tab and 100-25mg tablet     Pharmacy confirmed as:      [id number] - [place], [place] - [address]  [address]  [place] [address]  Phone: [phone] Fax: [phone]  : Yes    Additional Comments: .     OK to leave a message on voice mail? Yes    Primary language: English      needed? No    Call taken on September 7, 2018 at 11:11 AM by Lynne Irizarry

## 2018-09-11 DIAGNOSIS — I10 ESSENTIAL HYPERTENSION, BENIGN: ICD-10-CM

## 2018-09-11 RX ORDER — LOSARTAN POTASSIUM AND HYDROCHLOROTHIAZIDE 25; 100 MG/1; MG/1
1 TABLET ORAL DAILY
Qty: 30 TABLET | Refills: 2 | Status: SHIPPED | OUTPATIENT
Start: 2018-09-11 | End: 2019-05-02

## 2018-09-14 DIAGNOSIS — I10 ESSENTIAL HYPERTENSION, BENIGN: ICD-10-CM

## 2018-09-14 RX ORDER — AMLODIPINE BESYLATE 5 MG/1
5 TABLET ORAL DAILY
Qty: 90 TABLET | Refills: 3 | Status: SHIPPED | OUTPATIENT
Start: 2018-09-14 | End: 2019-05-02

## 2018-10-23 ENCOUNTER — TELEPHONE (OUTPATIENT)
Dept: FAMILY MEDICINE | Facility: CLINIC | Age: 53
End: 2018-10-23

## 2018-10-23 NOTE — TELEPHONE ENCOUNTER
----- Message from You Mitchell MD sent at 8/2/2018  9:24 AM CDT -----  Regarding: schedule follow-up  Needs follow-up of diabetes in November

## 2019-05-02 ENCOUNTER — OFFICE VISIT (OUTPATIENT)
Dept: FAMILY MEDICINE | Facility: CLINIC | Age: 54
End: 2019-05-02
Payer: COMMERCIAL

## 2019-05-02 VITALS
OXYGEN SATURATION: 92 % | BODY MASS INDEX: 32.64 KG/M2 | TEMPERATURE: 98.4 F | WEIGHT: 191.2 LBS | DIASTOLIC BLOOD PRESSURE: 128 MMHG | HEIGHT: 64 IN | HEART RATE: 91 BPM | RESPIRATION RATE: 24 BRPM | SYSTOLIC BLOOD PRESSURE: 206 MMHG

## 2019-05-02 DIAGNOSIS — I16.0 HYPERTENSIVE URGENCY: ICD-10-CM

## 2019-05-02 DIAGNOSIS — I10 ESSENTIAL HYPERTENSION, BENIGN: ICD-10-CM

## 2019-05-02 DIAGNOSIS — L40.9 PSORIASIS: ICD-10-CM

## 2019-05-02 DIAGNOSIS — K59.00 CONSTIPATION, UNSPECIFIED CONSTIPATION TYPE: ICD-10-CM

## 2019-05-02 DIAGNOSIS — R10.84 ABDOMINAL PAIN, GENERALIZED: ICD-10-CM

## 2019-05-02 DIAGNOSIS — Z00.00 PREVENTATIVE HEALTH CARE: Primary | ICD-10-CM

## 2019-05-02 LAB
ALBUMIN SERPL-MCNC: 4.2 MG/DL (ref 3.9–5.1)
ALP SERPL-CCNC: 73.5 U/L (ref 40–150)
ALT SERPL-CCNC: 24.8 U/L (ref 0–45)
AST SERPL-CCNC: 16.9 U/L (ref 0–55)
BILIRUB SERPL-MCNC: 1.8 MG/DL (ref 0.2–1.3)
BILIRUBIN DIRECT: 0.6 MG/DL (ref 0–0.2)
BUN SERPL-MCNC: 13.2 MG/DL (ref 7–21)
CALCIUM SERPL-MCNC: 9.1 MG/DL (ref 8.5–10.1)
CHLORIDE SERPLBLD-SCNC: 98.4 MMOL/L (ref 98–110)
CO2 SERPL-SCNC: 27.5 MMOL/L (ref 20–32)
CREAT SERPL-MCNC: 0.9 MG/DL (ref 0.7–1.3)
GFR SERPL CREATININE-BSD FRML MDRD: >90 ML/MIN/1.7 M2
GLUCOSE SERPL-MCNC: 191.8 MG'DL (ref 70–99)
HCT VFR BLD AUTO: 53.9 % (ref 40–53)
HEMOGLOBIN: 17 G/DL (ref 13.3–17.7)
LIPASE SERPL-CCNC: <9 U/L (ref 0–52)
MCH RBC QN AUTO: 29 PG (ref 26.5–35)
MCHC RBC AUTO-ENTMCNC: 31.5 G/DL (ref 32–36)
MCV RBC AUTO: 91.9 FL (ref 78–100)
PLATELET # BLD AUTO: 276 K/UL (ref 150–450)
POTASSIUM SERPL-SCNC: 3.8 MMOL/DL (ref 3.2–4.6)
PROT SERPL-MCNC: 7.8 G/DL (ref 6.8–8.8)
RBC # BLD AUTO: 5.9 M/UL (ref 4.4–5.9)
SODIUM SERPL-SCNC: 136.5 MMOL/L (ref 132–142)
WBC # BLD AUTO: 10.1 K/UL (ref 4–11)

## 2019-05-02 RX ORDER — POLYETHYLENE GLYCOL 3350 17 G/17G
1 POWDER, FOR SOLUTION ORAL DAILY
Qty: 850 G | Refills: 11 | Status: SHIPPED | OUTPATIENT
Start: 2019-05-02

## 2019-05-02 RX ORDER — CALCIPOTRIENE 50 UG/G
OINTMENT TOPICAL
Qty: 120 G | Refills: 3 | Status: SHIPPED | OUTPATIENT
Start: 2019-05-02 | End: 2020-01-17

## 2019-05-02 RX ORDER — METOPROLOL TARTRATE 25 MG/1
25 TABLET, FILM COATED ORAL DAILY
Qty: 90 TABLET | Refills: 3 | Status: SHIPPED | OUTPATIENT
Start: 2019-05-02 | End: 2020-01-17

## 2019-05-02 RX ORDER — BETAMETHASONE DIPROPIONATE 0.5 MG/G
OINTMENT, AUGMENTED TOPICAL 2 TIMES DAILY
Qty: 50 G | Refills: 3 | Status: SHIPPED | OUTPATIENT
Start: 2019-05-02 | End: 2020-01-17

## 2019-05-02 RX ORDER — AMLODIPINE BESYLATE 5 MG/1
5 TABLET ORAL DAILY
Qty: 90 TABLET | Refills: 3 | Status: SHIPPED | OUTPATIENT
Start: 2019-05-02 | End: 2020-01-17

## 2019-05-02 RX ORDER — METOPROLOL TARTRATE 25 MG/1
25 TABLET, FILM COATED ORAL DAILY
COMMUNITY
Start: 2012-06-04 | End: 2019-05-02

## 2019-05-02 RX ORDER — VALSARTAN AND HYDROCHLOROTHIAZIDE 160; 25 MG/1; MG/1
1 TABLET ORAL DAILY
Qty: 90 TABLET | Refills: 3 | Status: SHIPPED | OUTPATIENT
Start: 2019-05-02 | End: 2020-01-17

## 2019-05-02 ASSESSMENT — ANXIETY QUESTIONNAIRES
2. NOT BEING ABLE TO STOP OR CONTROL WORRYING: NOT AT ALL
IF YOU CHECKED OFF ANY PROBLEMS ON THIS QUESTIONNAIRE, HOW DIFFICULT HAVE THESE PROBLEMS MADE IT FOR YOU TO DO YOUR WORK, TAKE CARE OF THINGS AT HOME, OR GET ALONG WITH OTHER PEOPLE: NOT DIFFICULT AT ALL
5. BEING SO RESTLESS THAT IT IS HARD TO SIT STILL: NOT AT ALL
GAD7 TOTAL SCORE: 0
7. FEELING AFRAID AS IF SOMETHING AWFUL MIGHT HAPPEN: NOT AT ALL
1. FEELING NERVOUS, ANXIOUS, OR ON EDGE: NOT AT ALL
6. BECOMING EASILY ANNOYED OR IRRITABLE: NOT AT ALL
3. WORRYING TOO MUCH ABOUT DIFFERENT THINGS: NOT AT ALL

## 2019-05-02 ASSESSMENT — PATIENT HEALTH QUESTIONNAIRE - PHQ9
5. POOR APPETITE OR OVEREATING: NOT AT ALL
SUM OF ALL RESPONSES TO PHQ QUESTIONS 1-9: 0

## 2019-05-02 ASSESSMENT — PAIN SCALES - GENERAL: PAINLEVEL: SEVERE PAIN (6)

## 2019-05-02 ASSESSMENT — MIFFLIN-ST. JEOR: SCORE: 1621.03

## 2019-05-02 NOTE — PATIENT INSTRUCTIONS
FIT TEST  May 2, 2019 FIT TEST was ordered and given to patient.   Herbert Sofia CMA      Follow up next week for your blood pressure.    ADDENDUM 5/22/2019 8:37 AM: Dermatology referral had been sent to [facility]. Received a fax today that they have attempted to reach patient 3 times and cannot reach him.  Jasmyn Mustafa

## 2019-05-02 NOTE — NURSING NOTE
Critical Vital Report    Did patient have a critical vital during today's visit? Yes  Which vital is reporting as critical?: Blood Pressure    I personally notified the following: Provider    Action(s) taken: I left room and notified provider personally    Herbert Sofia CMA

## 2019-05-03 ASSESSMENT — ANXIETY QUESTIONNAIRES: GAD7 TOTAL SCORE: 0

## 2019-05-03 NOTE — PROGRESS NOTES
"Kyaw Silva comes in today for the following concerns:    He has been concerned that he is now for 8 days.  He states he has been getting increasingly constipated but has not had a change in the caliber of the stool.  He has not had any blood in stool or any black stools.  He has not had nausea or vomiting.  He has not had trouble swallowing.  He has not had an expectorant loss.  He would like something for this.  He is never had a colonoscopy or any other testing for colon cancer.    Patient has what appears to be psoriasis and he would like some different treatments for this.  He is seen a dermatologist in the past and was not very impressed.  He has been receiving some medication through our clinic but he states the cost on it is up to $150 per tube to like to try something different.    The patient did not bring this up as a concern but he has incredibly elevated blood pressure today.  He states that he was called \"about a month ago\" by his pharmacy telling him that there is been a recall on his losartan.  He stopped taking his losartan/hydrochlorothiazide at that time.  For whatever reason, he also stopped taking his metoprolol and his amlodipine at the same time.  Is not taking any blood pressure medications for over a month.  He is not having any headache blurred vision.  He does not have any weakness or numbness in the arms or legs.  He is not having chest pain, pressure, or tightness.  Is not having shortness of breath at rest or with exertion.    Allergies, medications and problem list reviewed and updated as needed in Epic.      REVIEW OF SYSTEMS    General: No fevers  Head: No headache  Neck: No swallowing problems   CV: No chest pain or palpitations  Resp: No shortness of breath.  No cough. No hemoptysis.  GI:  No nausea or vomiting  : No urinary c/o    BP (!) 206/128   Pulse 91   Temp 98.4  F (36.9  C) (Oral)   Resp 24   Ht 1.63 m (5' 4.17\")   Wt 86.7 kg (191 lb 3.2 oz)   SpO2 92%   BMI 32.64 " kg/m        Gen:  Well nourished and in NAD no  CV:  RRR  - no murmurs, rubs, or gallups,   Pulm:  CTAB, no wheezes/rales/rhonchi, good air entry   ABD: soft, nontender, no masses, no rebound, BS intact throughout  Extrem: no cyanosis, edema or clubbing  Skin: Psoriasiform rash noted on the scalp.  Psych: Euthymic     Results for orders placed or performed in visit on 05/02/19   Basic Metabolic Panel ([facility])  - Results < 1 hr   Result Value Ref Range    Urea Nitrogen 13.2 7.0 - 21.0 mg/dL    Calcium 9.1 8.5 - 10.1 mg/dL    Chloride 98.4 98.0 - 110.0 mmol/L    Carbon Dioxide 27.5 20.0 - 32.0 mmol/L    Creatinine 0.9 0.7 - 1.3 mg/dL    Glucose 191.8 (H) 70.0 - 99.0 mg'dL    Potassium 3.8 3.2 - 4.6 mmol/dL    Sodium 136.5 132.0 - 142.0 mmol/L    GFR Estimate >90 >60.0 mL/min/1.7 m2    GFR Estimate If Black >90 >60.0 mL/min/1.7 m2   CBC with Plt ([facility])   Result Value Ref Range    WBC 10.1 4.0 - 11.0 K/uL    RBC 5.9 4.4 - 5.9 M/uL    Hemoglobin 17.0 13.3 - 17.7 g/dL    Hematocrit 53.9 (H) 40.0 - 53.0 %    MCV 91.9 78.0 - 100.0 fL    MCH 29.0 26.5 - 35.0    MCHC 31.5 (L) 32.0 - 36.0 g/dL    Platelets 276.0 150.0 - 450.0 K/uL   Hepatic Panel ([place])   Result Value Ref Range    Albumin 4.2 3.9 - 5.1 mg/dL    Alkaline Phosphatase 73.5 40.0 - 150.0 U/L    ALT 24.8 0.0 - 45.0 U/L    AST 16.9 0.0 - 55.0 U/L    Bilirubin Direct 0.6 (H) 0.0 - 0.2 mg/dL    Bilirubin Total 1.8 (H) 0.2 - 1.3 mg/dL    Protein Total 7.8 6.8 - 8.8 g/dL   Lipase ([facility])   Result Value Ref Range    Lipase <9 0 - 52 U/L    Narrative    Test performed by:  Health system LABORATORY  [address] [place] [address]         ASSESSMENT and PLAN:  1. Preventative health care  - Fecal Occult Blood - FIT, iFOB ([facility]); Future    2. Hypertensive urgency  Hx labs and EKG in the clinic.  EKG shows left ventricular hypertrophy but no acute ST-T changes.  He has a normal creatinine.  At this point the patient seems to have severe asymptomatic  hypertension without any evidence of end organ damage.  We will have him restart his amlodipine and his metoprolol which he already has at home and will start a prescription for valsartan/hydrochlorothiazide that you take instead of his medication it was recalled.  He will come back in a week or less to recheck his blood pressure.  - Basic Metabolic Panel ([facility])  - Results < 1 hr  - CBC with Plt ([facility])  - EKG 12-lead complete w/read - Clinics  - Hepatic Panel ([place])    3. Abdominal pain, generalized    - Lipase ([facility])    4. Constipation, unspecified constipation type    - magnesium citrate solution; Take 296 mLs by mouth once for 1 dose  Dispense: 296 mL; Refill: 0  - polyethylene glycol (MIRALAX/GLYCOLAX) powder; Take 17 g (1 capful) by mouth daily  Dispense: 850 g; Refill: 11    5. Essential hypertension, benign    - valsartan-hydrochlorothiazide (DIOVAN HCT) 160-25 MG tablet; Take 1 tablet by mouth daily  Dispense: 90 tablet; Refill: 3  - metoprolol tartrate (LOPRESSOR) 25 MG tablet; Take 1 tablet (25 mg) by mouth daily  Dispense: 90 tablet; Refill: 3  - amLODIPine (NORVASC) 5 MG tablet; Take 1 tablet (5 mg) by mouth daily At bedtime  Dispense: 90 tablet; Refill: 3    6. Psoriasis    - calcipotriene (DOVONOX) 0.005 % external ointment; Apply to affected skin once a day.  Dispense: 120 g; Refill: 3  - augmented betamethasone dipropionate (DIPROLENE-AF) 0.05 % external ointment; Apply topically 2 times daily  Dispense: 50 g; Refill: 3  - DERMATOLOGY REFERRAL; Future      Total of 50 minutes was spent in face to face contact with patient with > 50% in counseling and coordination of care.  Options for treatment and/or follow-up care were reviewed with the patient/family who was engaged and actively involved in the decision making process and who verbalized understanding of the options discussed and was satisfied with the final plan.      Bert Mercer

## 2019-05-15 ENCOUNTER — MEDICAL CORRESPONDENCE (OUTPATIENT)
Dept: HEALTH INFORMATION MANAGEMENT | Facility: CLINIC | Age: 54
End: 2019-05-15

## 2019-05-22 ENCOUNTER — MEDICAL CORRESPONDENCE (OUTPATIENT)
Dept: HEALTH INFORMATION MANAGEMENT | Facility: CLINIC | Age: 54
End: 2019-05-22

## 2019-05-23 ENCOUNTER — OFFICE VISIT (OUTPATIENT)
Dept: FAMILY MEDICINE | Facility: CLINIC | Age: 54
End: 2019-05-23
Payer: COMMERCIAL

## 2019-05-23 VITALS
TEMPERATURE: 97.8 F | SYSTOLIC BLOOD PRESSURE: 116 MMHG | BODY MASS INDEX: 28.95 KG/M2 | DIASTOLIC BLOOD PRESSURE: 81 MMHG | HEIGHT: 64 IN | HEART RATE: 73 BPM | OXYGEN SATURATION: 96 % | WEIGHT: 169.6 LBS | RESPIRATION RATE: 16 BRPM

## 2019-05-23 DIAGNOSIS — Z43.3 COLOSTOMY CARE (H): Primary | ICD-10-CM

## 2019-05-23 DIAGNOSIS — I10 ESSENTIAL HYPERTENSION, BENIGN: ICD-10-CM

## 2019-05-23 DIAGNOSIS — C18.9 COLON CANCER METASTASIZED TO MESENTERIC LYMPH NODES (H): ICD-10-CM

## 2019-05-23 DIAGNOSIS — C77.2 COLON CANCER METASTASIZED TO MESENTERIC LYMPH NODES (H): ICD-10-CM

## 2019-05-23 DIAGNOSIS — E11.9 TYPE 2 DIABETES MELLITUS WITHOUT COMPLICATION, WITHOUT LONG-TERM CURRENT USE OF INSULIN (H): ICD-10-CM

## 2019-05-23 ASSESSMENT — MIFFLIN-ST. JEOR: SCORE: 1515.55

## 2019-05-23 NOTE — PROGRESS NOTES
Patient Active Problem List    Diagnosis Date Noted     Colon cancer metastasized to mesenteric lymph nodes (H) 05/03/2019     Priority: High     Obstructing left sided lesion excised 5/2019.  Colostomy.  CT/MRI suggested liver mets.  + nodes.  Referred for chemo  PATH:    TUMOR     Tumor Site:    Sigmoid colon      Histologic Type:    Adenocarcinoma      Histologic Grade:    G1: Well differentiated      Tumor Size:    Greatest dimension in Centimeters (cm): 5.5 Centimeters (cm)      Tumor Extent:           Tumor Extension:    Tumor invades through the muscularis propria into pericolorectal tissue        Macroscopic Tumor Perforation:    Not identified      Accessory Findings:           Lymphovascular Invasion:    Cannot be determined: suspicious        Perineural Invasion:    Not identified        Tumor Budding:    Number of tumor buds in one  hotspot  field (total number in area = 0.785 mm2): 8          :    Intermediate score (5-9)   MARGINS     Margins:           Proximal Margin:    Uninvolved by invasive carcinoma    LYMPH NODES   Number of Lymph Nodes Involved:    6    Number of Lymph Nodes Examined:    35     PATHOLOGIC STAGE CLASSIFICATION (pTNM, AJCC 8th Edition)   TNM Descriptors:    Not applicable    Primary Tumor (pT):    pT3    Regional Lymph Nodes (pN):    pN2a   COLON AND RECTUM: Biomarker Reporting Template  (COLON AND RECTUM: BIOMARKER REPORTING TEMPLATE - All Specimens)      RESULTS     Mismatch Repair:           Immunohistochemistry (IHC) Testing for Mismatch Repair (MMR) Proteins:    MLH1         MLH1 Result:    Intact nuclear expression       Immunohistochemistry (IHC) Testing for Mismatch Repair (MMR) Proteins:    MSH2         MSH2 Result:    Intact nuclear expression       Immunohistochemistry (IHC) Testing for Mismatch Repair (MMR) Proteins:    MSH6         MSH6 Result:    Intact nuclear expression       Immunohistochemistry (IHC) Testing for Mismatch Repair (MMR) Proteins:    PMS2          PMS2 Result:    Intact nuclear expression       Immunohistochemistry (IHC) Testing for Mismatch Repair (MMR) Proteins:    Background nonneoplastic tissue / internal control with intact nuclear expression         IHC Interpretation:    No loss of nuclear expression of MMR proteins: low probability of MSI-H    RESULTS   BRAF:     BRAF Mutational Analysis:    No mutations detected       Type 2 diabetes mellitus without complication, without long-term current use of insulin (H) 01/23/2017     Priority: High     Stroke, hemorrhagic (H) 08/02/2018     Priority: Medium     Residual right sided numbness, dysarthria       Proteinuria 05/25/2017     Priority: Medium     Hyperlipidemia, unspecified hyperlipidemia type 01/23/2017     Priority: Medium     Noncompliance with medication regimen 12/03/2015     Priority: Medium     Essential hypertension, benign 03/21/2013     Priority: Medium     Intracranial hemorrhage (H) 03/21/2013     Priority: Medium     Has seen Dr Justina Whitaker @ [facility]  Problem list name updated by automated process. Provider to review       Obstructive sleep apnea 03/21/2013     Priority: Medium     Problem list name updated by automated process. Provider to review       Health Care Home 03/21/2013     Priority: Medium     Tier Level: 1  DX V65.8 REPLACED WITH 77883 HEALTH CARE HOME (04/08/2013)       S/P ACL reconstruction 12/11/2014     Priority: Low     Dr Mckeon @ [place]  Hamstring autograft  Parital medial and lateral meniscectomy       Major depressive disorder with single episode, in remission (H) 03/21/2013     Priority: Low     Problem list name updated by automated process. Provider to review       There are no exam notes on file for this visit.  Chief Complaint   Patient presents with     RECHECK     Pt has constipated for the past few days. Pt went to ER last and was told he has tumors in his colon., so he is following up with ER visit     Blood pressure 116/81, pulse 73, temperature  "97.8  F (36.6  C), temperature source Oral, resp. rate 16, height 1.618 m (5' 3.7\"), weight 76.9 kg (169 lb 9.6 oz), SpO2 96 %.  Kyawis here for follow-up of his hospitalization, hypertension, and diabetes.  He presented to [facility] with abdominal pain was found to have an obstructing colon cance, and he ultimately went resection with colostomy.  Pathology showed multiple positive nodes, and an MRI of his liver that suggested metastatic disease.  He was discharged and is actually doing well.   regarding his hypertension he was restarted on his home medications and is doing well on those. now he needs  colostomy supplies and follow-up plans arranged.  His colostomy is working well.  He is frustrated that home care was not covered.  He has follow-up with his surgeon next week as well.  His blood sugars in the hospital were normal.  His kidney and liver function tests were normal as well.  He is aware that if he may need chemotherapy his wife asks me for help completing the FMLA form.  She will be missing work continuously through July 8 then intermittently through the rest of 2019 to help him with chemotherapy.     Review Of Systems  Skin: rash  Respiratory: No shortness of breath, dyspnea on exertion, cough, or hemoptysis  Cardiovascular: negative  Gastrointestinal:see above  Genitourinary: negative    EXAM:  Constitutional: healthy, alert and no distress   Cardiovascular: negative  Respiratory: negative  Psychiatric: mentation appears normal and affect normal/bright  Abdomen: colostomy left upper quad.  Incision CDI  JOINT/EXTREMITIES: no edema    HEMOGLOBIN A1C SCREENING  8.0 in hosp.      (Z43.3) Colostomy care (H)  (primary encounter diagnosis)  Comment: Rxs sent  Plan: order for DME, DISCONTINUED: order for DME        Info given re:  Ostomy team    (C18.9,  C77.2) Colon cancer metastasized to mesenteric lymph nodes (H)  Comment: will need chemo.  Has referral   Plan: I completed FMLA forms for his wife " today    (I10) Essential hypertension, benign  Comment: cont current meds--well controlle  Plan:     (E11.9) Type 2 diabetes mellitus without complication, without long-term current use of insulin (H)  Plan: AIC 8--will need to be rechecked again in 3-6 months.  Less of a priority at this time.

## 2019-05-23 NOTE — PATIENT INSTRUCTIONS
Check first at [facility]  If not, the use [employer] medical supply.      [employer] [facility]    Medical supply store in [place], [place]  Address: [address] [place] [address]  Hours:   Open ? Closes 5PM  Phone: [phone]

## 2019-05-28 ENCOUNTER — MEDICAL CORRESPONDENCE (OUTPATIENT)
Dept: HEALTH INFORMATION MANAGEMENT | Facility: CLINIC | Age: 54
End: 2019-05-28

## 2019-05-28 ENCOUNTER — TRANSFERRED RECORDS (OUTPATIENT)
Dept: HEALTH INFORMATION MANAGEMENT | Facility: CLINIC | Age: 54
End: 2019-05-28

## 2019-05-31 ENCOUNTER — TRANSFERRED RECORDS (OUTPATIENT)
Dept: HEALTH INFORMATION MANAGEMENT | Facility: CLINIC | Age: 54
End: 2019-05-31

## 2019-06-10 ENCOUNTER — MEDICAL CORRESPONDENCE (OUTPATIENT)
Dept: HEALTH INFORMATION MANAGEMENT | Facility: CLINIC | Age: 54
End: 2019-06-10

## 2019-06-13 ENCOUNTER — TRANSFERRED RECORDS (OUTPATIENT)
Dept: HEALTH INFORMATION MANAGEMENT | Facility: CLINIC | Age: 54
End: 2019-06-13

## 2019-06-14 ENCOUNTER — TRANSFERRED RECORDS (OUTPATIENT)
Dept: HEALTH INFORMATION MANAGEMENT | Facility: CLINIC | Age: 54
End: 2019-06-14

## 2019-06-24 ENCOUNTER — TRANSFERRED RECORDS (OUTPATIENT)
Dept: HEALTH INFORMATION MANAGEMENT | Facility: CLINIC | Age: 54
End: 2019-06-24

## 2019-07-26 ENCOUNTER — TRANSFERRED RECORDS (OUTPATIENT)
Dept: HEALTH INFORMATION MANAGEMENT | Facility: CLINIC | Age: 54
End: 2019-07-26

## 2019-08-01 ENCOUNTER — TRANSFERRED RECORDS (OUTPATIENT)
Dept: HEALTH INFORMATION MANAGEMENT | Facility: CLINIC | Age: 54
End: 2019-08-01

## 2019-08-09 ENCOUNTER — TRANSFERRED RECORDS (OUTPATIENT)
Dept: HEALTH INFORMATION MANAGEMENT | Facility: CLINIC | Age: 54
End: 2019-08-09

## 2019-08-16 ENCOUNTER — TRANSFERRED RECORDS (OUTPATIENT)
Dept: HEALTH INFORMATION MANAGEMENT | Facility: CLINIC | Age: 54
End: 2019-08-16

## 2019-08-30 ENCOUNTER — TRANSFERRED RECORDS (OUTPATIENT)
Dept: HEALTH INFORMATION MANAGEMENT | Facility: CLINIC | Age: 54
End: 2019-08-30

## 2019-09-17 DIAGNOSIS — R21 RASH: ICD-10-CM

## 2019-09-17 RX ORDER — TRIAMCINOLONE ACETONIDE 1 MG/ML
LOTION TOPICAL 2 TIMES DAILY
Qty: 60 ML | Refills: 1 | Status: SHIPPED | OUTPATIENT
Start: 2019-09-17 | End: 2020-01-17

## 2019-09-20 ENCOUNTER — TRANSFERRED RECORDS (OUTPATIENT)
Dept: HEALTH INFORMATION MANAGEMENT | Facility: CLINIC | Age: 54
End: 2019-09-20

## 2019-09-24 ENCOUNTER — TELEPHONE (OUTPATIENT)
Dept: FAMILY MEDICINE | Facility: CLINIC | Age: 54
End: 2019-09-24

## 2019-09-24 DIAGNOSIS — L40.9 PSORIASIS: ICD-10-CM

## 2019-09-24 DIAGNOSIS — R21 RASH: ICD-10-CM

## 2019-09-25 RX ORDER — FLUOCINONIDE 1 MG/G
CREAM TOPICAL 2 TIMES DAILY
Qty: 60 G | Refills: 1 | Status: SHIPPED | OUTPATIENT
Start: 2019-09-25 | End: 2020-01-17

## 2019-10-01 ENCOUNTER — TRANSFERRED RECORDS (OUTPATIENT)
Dept: HEALTH INFORMATION MANAGEMENT | Facility: CLINIC | Age: 54
End: 2019-10-01

## 2019-10-01 DIAGNOSIS — E11.9 TYPE 2 DIABETES MELLITUS WITHOUT COMPLICATION, WITHOUT LONG-TERM CURRENT USE OF INSULIN (H): ICD-10-CM

## 2019-10-01 RX ORDER — ATORVASTATIN CALCIUM 10 MG/1
10 TABLET, FILM COATED ORAL DAILY
Qty: 90 TABLET | Refills: 3 | Status: SHIPPED | OUTPATIENT
Start: 2019-10-01 | End: 2020-01-17

## 2019-10-11 ENCOUNTER — TRANSFERRED RECORDS (OUTPATIENT)
Dept: HEALTH INFORMATION MANAGEMENT | Facility: CLINIC | Age: 54
End: 2019-10-11

## 2019-10-25 ENCOUNTER — TRANSFERRED RECORDS (OUTPATIENT)
Dept: HEALTH INFORMATION MANAGEMENT | Facility: CLINIC | Age: 54
End: 2019-10-25

## 2019-11-19 ENCOUNTER — MEDICAL CORRESPONDENCE (OUTPATIENT)
Dept: HEALTH INFORMATION MANAGEMENT | Facility: CLINIC | Age: 54
End: 2019-11-19

## 2019-11-22 ENCOUNTER — DOCUMENTATION ONLY (OUTPATIENT)
Dept: FAMILY MEDICINE | Facility: CLINIC | Age: 54
End: 2019-11-22

## 2019-11-22 ENCOUNTER — TRANSFERRED RECORDS (OUTPATIENT)
Dept: HEALTH INFORMATION MANAGEMENT | Facility: CLINIC | Age: 54
End: 2019-11-22

## 2019-11-22 NOTE — PROGRESS NOTES
To be completed in Nursing note:    Please reference list for forms that require a visit for completion.  Please remind patients that providers are given 3-5 business days to complete and return forms.      Form type: F2F Certification    Date form received: 11/15/19    Date form completed by Physician: 11/19/19    How was form returned to patient (mailed, faxed, or at  for patient to ): Fax to [facility] at [phone]    Date form mailed/faxed/left at  for patient and sent to HIM for scanning: Faxed 11/21/19      Once form is left for patient, faxed, or mailed PCS will then close the documentation only encounter.

## 2019-12-20 ENCOUNTER — TRANSFERRED RECORDS (OUTPATIENT)
Dept: HEALTH INFORMATION MANAGEMENT | Facility: CLINIC | Age: 54
End: 2019-12-20

## 2020-01-17 ENCOUNTER — OFFICE VISIT (OUTPATIENT)
Dept: FAMILY MEDICINE | Facility: CLINIC | Age: 55
End: 2020-01-17
Payer: COMMERCIAL

## 2020-01-17 VITALS
SYSTOLIC BLOOD PRESSURE: 141 MMHG | HEIGHT: 64 IN | DIASTOLIC BLOOD PRESSURE: 93 MMHG | TEMPERATURE: 98.3 F | OXYGEN SATURATION: 96 % | HEART RATE: 71 BPM | RESPIRATION RATE: 24 BRPM | BODY MASS INDEX: 32.1 KG/M2 | WEIGHT: 188 LBS

## 2020-01-17 DIAGNOSIS — C18.9 COLON CANCER METASTASIZED TO MESENTERIC LYMPH NODES (H): ICD-10-CM

## 2020-01-17 DIAGNOSIS — E11.9 TYPE 2 DIABETES MELLITUS WITHOUT COMPLICATION, WITHOUT LONG-TERM CURRENT USE OF INSULIN (H): ICD-10-CM

## 2020-01-17 DIAGNOSIS — C77.2 COLON CANCER METASTASIZED TO MESENTERIC LYMPH NODES (H): ICD-10-CM

## 2020-01-17 DIAGNOSIS — M10.9 ACUTE GOUTY ARTHRITIS: ICD-10-CM

## 2020-01-17 DIAGNOSIS — R21 RASH: ICD-10-CM

## 2020-01-17 DIAGNOSIS — Z01.818 PREOP GENERAL PHYSICAL EXAM: Primary | ICD-10-CM

## 2020-01-17 DIAGNOSIS — I10 ESSENTIAL HYPERTENSION, BENIGN: ICD-10-CM

## 2020-01-17 LAB
ALBUMIN SERPL BCP-MCNC: 4.2 G/DL (ref 3.5–5)
ALP SERPL-CCNC: 92 U/L (ref 45–120)
ALT SERPL W/O P-5'-P-CCNC: 68 U/L (ref 0–45)
ANION GAP SERPL CALCULATED.3IONS-SCNC: 13 MMOL/L (ref 5–18)
AST SERPL-CCNC: 42 U/L (ref 0–40)
BASOPHILS # BLD AUTO: 0 THOU/UL (ref 0–0.2)
BASOPHILS NFR BLD AUTO: 0 % (ref 0–2)
BILIRUB SERPL-MCNC: 1.1 MG/DL (ref 0–1)
BUN SERPL-MCNC: 21 MG/DL (ref 8–22)
CALCIUM SERPL-MCNC: 10.1 MG/DL (ref 8.5–10.5)
CHLORIDE SERPL-SCNC: 98 MMOL/L (ref 98–107)
CO2 SERPL-SCNC: 24 MMOL/L (ref 22–31)
CREAT SERPL-MCNC: 1.31 MG/DL (ref 0.7–1.3)
EOSINOPHIL # BLD AUTO: 0.1 THOU/UL (ref 0–0.4)
EOSINOPHIL NFR BLD AUTO: 1 % (ref 0–6)
ERYTHROCYTE [DISTWIDTH] IN BLOOD BY AUTOMATED COUNT: 11.6 % (ref 11–14.5)
GLUCOSE SERPL-MCNC: 371 MG/DL (ref 70–125)
HBA1C MFR BLD: 11.2 % (ref 4.1–5.7)
HCT VFR BLD AUTO: 53.3 % (ref 40–54)
HGB BLD-MCNC: 18.5 G/DL (ref 14–18)
LYMPHOCYTES # BLD AUTO: 2.2 THOU/UL (ref 0.8–4.4)
LYMPHOCYTES NFR BLD AUTO: 30 % (ref 20–40)
MCH RBC QN AUTO: 32.2 PG (ref 27–34)
MCHC RBC AUTO-ENTMCNC: 34.7 G/DL (ref 32–36)
MCV RBC AUTO: 93 FL (ref 80–100)
MONOCYTES # BLD AUTO: 0.5 THOU/UL (ref 0–0.9)
MONOCYTES NFR BLD AUTO: 6 % (ref 2–10)
NEUTROPHILS # BLD AUTO: 4.5 THOU/UL (ref 2–7.7)
NEUTROPHILS NFR BLD AUTO: 62 % (ref 50–70)
PLATELET # BLD AUTO: 148 THOU/UL (ref 140–440)
PMV BLD AUTO: 11.3 FL (ref 8.5–12.5)
POTASSIUM SERPL-SCNC: 4 MMOL/L (ref 3.5–5)
PROT SERPL-MCNC: 8.4 G/DL (ref 6–8)
RBC # BLD AUTO: 5.74 MILL/UL (ref 4.4–6.2)
SODIUM SERPL-SCNC: 135 MMOL/L (ref 136–145)
WBC # BLD AUTO: 7.3 THOU/UL (ref 4–11)

## 2020-01-17 RX ORDER — TRIAMCINOLONE ACETONIDE 1 MG/ML
LOTION TOPICAL 2 TIMES DAILY
Qty: 60 ML | Refills: 5 | Status: SHIPPED | OUTPATIENT
Start: 2020-01-17 | End: 2021-01-15

## 2020-01-17 RX ORDER — AMLODIPINE BESYLATE 5 MG/1
5 TABLET ORAL DAILY
Qty: 90 TABLET | Refills: 3 | Status: SHIPPED | OUTPATIENT
Start: 2020-01-17 | End: 2021-01-15

## 2020-01-17 RX ORDER — VALSARTAN AND HYDROCHLOROTHIAZIDE 160; 25 MG/1; MG/1
1 TABLET ORAL DAILY
Qty: 90 TABLET | Refills: 3 | Status: SHIPPED | OUTPATIENT
Start: 2020-01-17 | End: 2021-01-15

## 2020-01-17 RX ORDER — INDOMETHACIN 25 MG/1
CAPSULE ORAL
Qty: 60 CAPSULE | Refills: 1 | Status: SHIPPED | OUTPATIENT
Start: 2020-01-17 | End: 2020-07-16

## 2020-01-17 RX ORDER — METOPROLOL TARTRATE 25 MG/1
25 TABLET, FILM COATED ORAL DAILY
Qty: 90 TABLET | Refills: 3 | Status: SHIPPED | OUTPATIENT
Start: 2020-01-17 | End: 2021-01-15

## 2020-01-17 ASSESSMENT — PAIN SCALES - GENERAL: PAINLEVEL: NO PAIN (0)

## 2020-01-17 ASSESSMENT — MIFFLIN-ST. JEOR: SCORE: 1600.27

## 2020-01-17 NOTE — NURSING NOTE
Chief Complaint   Patient presents with     Pre-Op Exam     Having Colon Procedure/Surgery on 01/22/20      Herbert Sofia CMA

## 2020-01-17 NOTE — PROGRESS NOTES
[facility] [address] [place] [address]  Phone: [phone]  Fax: [phone]    1/17/2020    Adult PRE-OP Evaluation:    Kyaw Silva, 1965 presents for pre-operative evaluation and assessment as requested by Dr. March, prior to undergoing surgery/procedure for treatment of  Colostomy takedown .    Proposed procedure: as above    Date of Surgery/ Procedure: 1/22/2020  Hospital/Surgical Facility: [facility]     Primary Physician: You Mitchell  Type of Anesthesia Anticipated: General  History of anesthesia complications: NONE  History of  abnormal bleeding: NONE   History of blood transfusions: NO  Patient has a Health Care Directive or Living Will:  NO    Preoperative Questions   1. YES - Do you have a history of heart attack, stroke, stent, bypass or surgery on an artery in the head, neck, heart or legs? Had a stroke--see problem list. 2010.  Hemorrhagic stroke with right sided weakness  2. NO - Do you ever have any pain or discomfort in your chest?  3. NO - Have you ever had a severe pain across the front of your chest lasting for half an hour or more?  4. NO - Do you have a history of Congestive Heart Failure?  5. NO - Are you troubled by shortness of breath when: walking on the level/ up a slight hill/ at night?  6. NO - Does your chest ever sound wheezy or whistling?  7. NO - Do you currently have a cold, bronchitis or other respiratory infection?  8. NO - Have you had a cold, bronchitis or other respiratory infection within the last 2 weeks?  9. NO - Do you usually have a cough?  10. NO - Do you sometimes get pains in the calves of your legs when you walk?  11. NO - Do you or anyone in your family have previous history of blood clots?  12. NO - Do you or does anyone in your family have a serious bleeding problem such as prolonged bleeding following surgeries or cuts?  13. NO - Have you ever had problems with anemia or been told to take iron pills?  14. NO - Have you had any abnormal blood  loss such as black, tarry or bloody stools, or abnormal vaginal bleeding?  15. NO - Have you ever had a blood transfusion?  16. NO - Have you or any of your relatives ever had problems with anesthesia?  17. YES - Do you have sleep apnea, excessive snoring or daytime drowsiness? Has NIRU on CPAP  18. NO - Do you have any prosthetic heart valves?  19. NO - Do you have prosthetic joints?  20. NO - Is there any chance that you may be pregnant?    .prob    Current Outpatient Medications   Medication     amLODIPine (NORVASC) 5 MG tablet     blood glucose (NO BRAND SPECIFIED) lancets standard     blood glucose monitoring (NO BRAND SPECIFIED) meter device kit     blood glucose monitoring (NO BRAND SPECIFIED) test strip     Blood Pressure Monitor KIT     metoprolol tartrate (LOPRESSOR) 25 MG tablet     order for DME     order for DME     polyethylene glycol (MIRALAX/GLYCOLAX) powder     triamcinolone (KENALOG) 0.1 % external lotion     valsartan-hydrochlorothiazide (DIOVAN HCT) 160-25 MG tablet     No current facility-administered medications for this visit.        OTC products: none    Allergies   Allergen Reactions     Lisinopril Cough     Latex Allergy: NO    Social History     Socioeconomic History     Marital status:      Spouse name: None     Number of children: None     Years of education: None     Highest education level: None   Occupational History     None   Social Needs     Financial resource strain: None     Food insecurity:     Worry: None     Inability: None     Transportation needs:     Medical: None     Non-medical: None   Tobacco Use     Smoking status: Never Smoker     Smokeless tobacco: Never Used   Substance and Sexual Activity     Alcohol use: No     Drug use: No     Sexual activity: Yes     Partners: Female   Lifestyle     Physical activity:     Days per week: None     Minutes per session: None     Stress: None   Relationships     Social connections:     Talks on phone: None     Gets together:  "None     Attends [employer] service: None     Active member of club or organization: None     Attends meetings of clubs or organizations: None     Relationship status: None     Intimate partner violence:     Fear of current or ex partner: None     Emotionally abused: None     Physically abused: None     Forced sexual activity: None   Other Topics Concern     None   Social History Narrative     None       REVIEW OF SYSTEMS:   Constitutional, HEENT, cardiovascular, pulmonary, GI, , musculoskeletal, neuro, skin, endocrine and psych systems are negative, except as otherwise noted.    EXAM:     Patient Vitals for the past 24 hrs:   BP Temp Temp src Pulse Resp SpO2 Height Weight   01/17/20 1531 (!) 141/93 98.3  F (36.8  C) Oral 71 24 96 % 1.62 m (5' 3.78\") 85.3 kg (188 lb)     Body mass index is 32.49 kg/m .  GENERAL: healthy, alert and no distress  EYES: Eyes grossly normal to inspection and pterygium bilaterally  HENT: ear canals- normal; TMs- normal; Nose- normal; Mouth- no ulcers, no lesions  NECK: no tenderness, no adenopathy, no asymmetry, no masses, no stiffness; thyroid- normal to palpation  RESP: lungs clear to auscultation - no rales, no rhonchi, no wheezes  CV: regular rates and rhythm, normal S1 S2, no S3 or S4 and no murmur, no click or rub -  ABDOMEN: no HSM.  Midline scar healed.  Colostomy in LUQ clearn  MS: extremities- no gross deformities noted, no edema  SKIN: no suspicious lesions, no rashes.  Mild psoriasis in scalp  NEURO: 4/5 strength in RUE.  Mild flattening of right nasal labial fold  BACK: no CVA tenderness, no paralumbar tenderness  PSYCH: Alert and oriented times 3; speech- coherent , normal rate and volume; able to articulate logical thoughts  LYMPHATICS: ant. cervical- normal, post. cervical- normal    DIAGNOSTICS:      EKG:   Cbc, comp, a1c pending    RISK ASSESSMENT:     Cardiovascular Risk:  -Patient is able to walk up a flight of stairs without chest pain.  -The patient does not have " chest pain with exertion.  -Patient does not have a history of congestive heart failure.    -The patient does have a history of stroke and does not have a history of valvular disease.    Pulmonary Risk:  -In terms of risk factors for pulmonary complication, the patient has NIRU on cpap    Perioperative Complications:  -The patient does not have a history of bleeding or clotting problems in the past.    -The patient has not had complications from surgeries.    -The patient does not have a family history of any anesthesia or surgical complications.      IMPRESSION:   Reason for surgery/procedure: takedown of colostomy    The proposed surgical procedure is considered INTERMEDIATE risk.    For above listed surgery and anesthesia:   Patient is at moderate risk for surgery/procedure and perioperative/procedure complications.    RECOMMENDATIONS:     Labs:  Results for orders placed or performed in visit on 01/17/20   Hemoglobin A1c ([facility])     Status: Abnormal   Result Value Ref Range    Hemoglobin A1C 11.2 (H) 4.1 - 5.7 %   Comprehensive Metabolic ([facility]) - Results > 1 hr     Status: Abnormal   Result Value Ref Range    Sodium 135 (L) 136 - 145 mmol/L    Potassium 4.0 3.5 - 5.0 mmol/L    Chloride 98 98 - 107 mmol/L    CO2, Total 24 22 - 31 mmol/L    Anion Gap 13 5 - 18 mmol/L    Glucose 371 (H) 70 - 125 mg/dL    Urea Nitrogen 21 8 - 22 mg/dL    Creatinine 1.31 (H) 0.70 - 1.30 mg/dL    GFR Estimate If Black >60 >60 mL/min/1.73m2    GFR Estimate 57 (L) >60 mL/min/1.73m2    Bilirubin Total 1.1 (H) 0.0 - 1.0 mg/dL    Calcium 10.1 8.5 - 10.5 mg/dL    Protein Total 8.4 (H) 6.0 - 8.0 g/dL    Albumin 4.2 3.5 - 5.0 g/dL    Alkaline Phosphatase 92 45 - 120 U/L    AST (SGOT) 42 (H) 0 - 40 U/L    ALT (SGPT) 68 (H) 0 - 45 U/L   CBC w/ Diff and Plt ([facility])     Status: Abnormal   Result Value Ref Range    WBC 7.3 4.0 - 11.0 thou/uL    RBC 5.74 4.40 - 6.20 mill/uL    Hemoglobin 18.5 (H) 14.0 - 18.0 g/dL    Hematocrit 53.3 40.0  - 54.0 %    MCV 93 80 - 100 fL    MCH 32.2 27.0 - 34.0 pg    MCHC 34.7 32.0 - 36.0 g/dL    RDW 11.6 11.0 - 14.5 %    Platelets 148 140 - 440 thou/uL    Mean Platelet Volume 11.3 8.5 - 12.5 fL    % Neutrophils 62 50 - 70 %    % Lymphocytes 30 20 - 40 %    % Monocytes 6 2 - 10 %    % Eosinophils 1 0 - 6 %    % Basophils 0 0 - 2 %    Neutrophils (Absolute) 4.5 2.0 - 7.7 thou/uL    Lymphs (Absolute) 2.2 0.8 - 4.4 thou/uL    Monocytes(Absolute) 0.5 0.0 - 0.9 thou/uL    Eos (Absolute) 0.1 0.0 - 0.4 thou/uL    Baso (Absolute) 0.0 0.0 - 0.2 thou/uL         Preop Plan:  Pt has poor glycemic control and cannot proceed w/ surgery at this time, given that it's elective and glycemic control puts him at risk for poor healing and infection.    Please see comments on lab report.  He will need to reschedule surgery in 4-6 weeks and we'll initiate metformin and insulin therapy in order to get blood sugars at least in 100s, to better optimize risk.       You Mitchell MD    Please contact our office if there are any further questions or information required about this patient.

## 2020-01-20 ENCOUNTER — TELEPHONE (OUTPATIENT)
Dept: FAMILY MEDICINE | Facility: CLINIC | Age: 55
End: 2020-01-20

## 2020-01-20 DIAGNOSIS — E11.9 TYPE 2 DIABETES MELLITUS WITHOUT COMPLICATION, WITHOUT LONG-TERM CURRENT USE OF INSULIN (H): ICD-10-CM

## 2020-01-20 RX ORDER — METFORMIN HCL 500 MG
TABLET, EXTENDED RELEASE 24 HR ORAL
Qty: 60 TABLET | Refills: 11 | Status: SHIPPED | OUTPATIENT
Start: 2020-01-20 | End: 2020-03-05

## 2020-01-20 NOTE — TELEPHONE ENCOUNTER
Please call pharmacy.  Max dose is not known yet, since we are starting, but let's pick a max dose of 30 units, for now.  Thanks  CF

## 2020-01-20 NOTE — RESULT ENCOUNTER NOTE
Triage  Will need to contact his surgeon, Dr Catie March, and cancel his surgery for Wed due to poor glycemic control.  Please let them know that a random glucose was 371 and his AIC was over 11.  We'll plan on intitiating therapy for him soon and expect that we can achieve reasonable glycemic control in the next month--could probably reschedule for mid/late February, realistically.  Here is her contact info:  Phone: [phone]; Pager: [phone]; Fax: [phone]    Please also call pt's wife, Zachariah Silva, at home and let her know that we have to reschedule surgery due to his blood sugars because at this level of diabetes control, his risk is too high to proceed (risk is poor wound healing and infection).  I'm hopeful that we can see him weekly over the next month and bring his blood sugars down to the 100s, where they were in the past, through modification of his diet, restarting metformin, and starting insulin.  I'd like him to come in and see one of my partners ASAP this week to get that started.  He should also have a nurse visit at the same time to go over monitoring and new insulin mgmt.     I have sent a Rx for metformin, lantus insulin, test strips, lancets, and a glucometer to his pharmacy.  They should start the metformin now, but wait on the insulin until they receive instructions.    We should set up 2 times weekly nurse calls so that we can titrate up his lantus dose to fasting sugars in the low-mid 100s.    Here is a titration schedule that we can use, for reference.      titration schedule for basal insulin, based on fasting blood glucose levels for 3 consecutive days    Average of 3  readings    Action  > 180 mg/dL     increase basal insulin by 8 units  160-180 mg/dL     increase basal insulin by 6 units  140-159 mg/dL     increase basal insulin by 4 units  120-139 mg/dL    increase basal insulin by 2 units  100-119 mg/dL    increase basal insulin by 1 unit  80-99 mg/dL      maintain current  dose  60-79 mg/dL      decrease basal insulin by 2 units  < 60 mg/dL      decrease basal insulin by 2 units

## 2020-01-22 ENCOUNTER — DOCUMENTATION ONLY (OUTPATIENT)
Dept: PHARMACY | Facility: PHYSICIAN GROUP | Age: 55
End: 2020-01-22

## 2020-01-22 ENCOUNTER — OFFICE VISIT (OUTPATIENT)
Dept: FAMILY MEDICINE | Facility: CLINIC | Age: 55
End: 2020-01-22
Payer: COMMERCIAL

## 2020-01-22 ENCOUNTER — OFFICE VISIT (OUTPATIENT)
Dept: PHARMACY | Facility: PHYSICIAN GROUP | Age: 55
End: 2020-01-22
Payer: COMMERCIAL

## 2020-01-22 VITALS
WEIGHT: 187.8 LBS | BODY MASS INDEX: 32.06 KG/M2 | OXYGEN SATURATION: 96 % | HEIGHT: 64 IN | SYSTOLIC BLOOD PRESSURE: 163 MMHG | RESPIRATION RATE: 24 BRPM | DIASTOLIC BLOOD PRESSURE: 98 MMHG | TEMPERATURE: 97.6 F | HEART RATE: 64 BPM

## 2020-01-22 DIAGNOSIS — E11.9 TYPE 2 DIABETES MELLITUS WITHOUT COMPLICATION, WITHOUT LONG-TERM CURRENT USE OF INSULIN (H): Primary | ICD-10-CM

## 2020-01-22 DIAGNOSIS — R74.8 ELEVATED CREATINE KINASE: ICD-10-CM

## 2020-01-22 LAB
ANION GAP SERPL CALCULATED.3IONS-SCNC: 14 MMOL/L (ref 5–18)
BUN SERPL-MCNC: 19 MG/DL (ref 8–22)
CALCIUM SERPL-MCNC: 10 MG/DL (ref 8.5–10.5)
CHLORIDE SERPL-SCNC: 97 MMOL/L (ref 98–107)
CO2 SERPL-SCNC: 25 MMOL/L (ref 22–31)
CREAT SERPL-MCNC: 1.23 MG/DL (ref 0.7–1.3)
GLUCOSE SERPL-MCNC: 333 MG/DL (ref 70–125)
POTASSIUM SERPL-SCNC: 4.5 MMOL/L (ref 3.5–5)
SODIUM SERPL-SCNC: 136 MMOL/L (ref 136–145)

## 2020-01-22 PROCEDURE — 99207 ZZC NO CHARGE LOS: CPT | Performed by: PHARMACIST

## 2020-01-22 ASSESSMENT — MIFFLIN-ST. JEOR: SCORE: 1599.37

## 2020-01-22 ASSESSMENT — PAIN SCALES - GENERAL: PAINLEVEL: NO PAIN (0)

## 2020-01-22 NOTE — PROGRESS NOTES
Preceptor Attestation:   Patient seen, evaluated and discussed with the resident. I have verified the content of the note, which accurately reflects my assessment of the patient and the plan of care.   Supervising Physician:  Arcenio Velasquez MD.

## 2020-01-22 NOTE — PROGRESS NOTES
SUBJECTIVE       Kyaw Silva is a 54 year old  male with a PMH significant for:     Patient Active Problem List   Diagnosis     Essential hypertension, benign     Intracranial hemorrhage (H)     Major depressive disorder with single episode, in remission (H)     Obstructive sleep apnea     Health Care Home     S/P ACL reconstruction     Noncompliance with medication regimen     Type 2 diabetes mellitus without complication, without long-term current use of insulin (H)     Hyperlipidemia, unspecified hyperlipidemia type     Proteinuria     Stroke, hemorrhagic (H)     Colon cancer metastasized to mesenteric lymph nodes (H)     Patient presents with:  RECHECK: Diabetes Mellitus Check/ High Glucose Levels     This is a 54-year-old male with a history of well-controlled type 2 diabetes and diagnosis of colon cancer last spring and is now status post colectomy with colostomy.  He is here to follow-up on diabetes.  He was seen in our clinic last week for preop physical in preparation for colostomy revision, however he was found to have an elevated A1c of 11.2% on 1/17/2020.  He presents today with his daughter and wife.  He has several questions about his diabetes and wonders why it has worsened since last check in July.  He was sent a prescription for Lantus and refill of metformin by his PCP via phone call after the lab results and last week.  He did pick these up today but has not yet started them.  He has been on metformin in the past but not recently and denies having any side effects from this.  He has never checked his blood sugars before.  He is unsure how to administer Lantus.  He denies any blurry vision, excessive thirst or excessive urination.  He does check his blood pressure at home and it is consistently less than 140.  He states his blood pressure is high when he comes into clinic.    PMH, Medications and Allergies were reviewed and updated as needed.    ROS: As above per HPI        OBJECTIVE  "    Vitals:    01/22/20 1405   BP: (!) 163/98   Pulse: 64   Resp: 24   Temp: 97.6  F (36.4  C)   TempSrc: Oral   SpO2: 96%   Weight: 85.2 kg (187 lb 12.8 oz)   Height: 1.62 m (5' 3.78\")     Body mass index is 32.46 kg/m .    GENERAL: No acute distress, non-toxic appearing  HEAD: Atraumatic, normocephalic  EYES: PERRL, EOMI  LUNGS: Respirations unlabored  EXT: No peripheral edema, atraumatic  NEURO: Alert, coherent, interacts appropriately, no gross neurologic deficits  PSYCH: euthymic, normal affect, thought content is appropriate      LABS/IMAGING/EKG  No results found for this or any previous visit (from the past 24 hour(s)).   Results for orders placed or performed in visit on 01/22/20   Basic Metabolic Profile ([facility])     Status: Abnormal   Result Value Ref Range    Sodium 136 136 - 145 mmol/L    Potassium 4.5 3.5 - 5.0 mmol/L    Chloride 97 (L) 98 - 107 mmol/L    CO2, Total 25 22 - 31 mmol/L    Anion Gap 14 5 - 18 mmol/L    Glucose 333 (H) 70 - 125 mg/dL    Calcium 10.0 8.5 - 10.5 mg/dL    Urea Nitrogen 19 8 - 22 mg/dL    Creatinine 1.23 0.70 - 1.30 mg/dL    GFR Estimate If Black >60 >60 mL/min/1.73m2    GFR Estimate >60 >60 mL/min/1.73m2    Narrative    Test performed by:  [facility] [address] [place] [address]  Fasting Glucose reference range is 70-99 mg/dL per  American Diabetes Association (ADA) guidelines.       ASSESSMENT AND PLAN     1. Type 2 diabetes mellitus without complication, without long-term current use of insulin (H)  Patient here for follow-up of diabetes.  Found to have an A1c of 11.2% on 1/17/2020, which is up from 7.4% in July 2018.  The last year has been complicated by colon cancer diagnosis and colectomy.  He has been on metformin previously but not recently.  His primary sent prescriptions last week.  He will start on metformin and Lantus 10 units nightly.  Our pharmacist met with him today to teach him how to use the glucometer and administer insulin injections.  " I instructed him on self titration of Lantus and provided written instructions on this today.  I also asked that he call into clinic twice weekly to relay blood sugars to the nursing staff.  Follow-up in 2 weeks for recheck with his PCP.    2. Elevated creatine kinase  He was also found have an elevated creatinine of 1.31 last week, which I suspect is from uncontrolled diabetes.  We repeated this today, which showed improvement to 1.2.  He should have urine testing for proteinuria in the near future.  I will plan on this with his repeat A1c check in a couple of months.  He is already on an angiotensin receptor blocker.  - Basic Metabolic Profile (Sleep [facility])      Options for treatment and follow-up care were reviewed with patient's parent who was engaged and actively involved in the decision making process, verbalized understanding of the options discussed, and satisfied with the final plan.      Dorothy Owen MD PGY3  [facility]  [phone] (P)    Discussed with Dr. Velasquez who agrees with the above assessment and plan.

## 2020-01-22 NOTE — NURSING NOTE
Chief Complaint   Patient presents with     RECHECK     Diabetes Mellitus Check/ High Glucose Levels      Herbert Sofia CMA    Due to patient being non-English speaking/uses sign language, an  was used for this visit. Only for face-to-face interpretation by an external agency, date and length of interpretation can be found on the scanned worksheet.     name: Shayan Dan   Agency: [employer]  Language: mehreen   Telephone number: [phone]  Type of interpretation: Group, spoken; number of participants: 3     Herbert Sofia CMA

## 2020-01-22 NOTE — PATIENT INSTRUCTIONS
Please follow up for recheck in 2 weeks.     We should set up 2 times weekly nurse calls so that we can titrate up his lantus dose to fasting sugars in the low-mid 100s.     Here is a titration schedule that we can use, for reference.       titration schedule for basal insulin, based on fasting blood glucose levels for 3 consecutive days:     Average of 3  readings                                         Action  > 180 mg/dL                                   increase basal insulin by 8 units  160-180 mg/dL                               increase basal insulin by 6 units  140-159 mg/dL                               increase basal insulin by 4 units  120-139 mg/dL                               increase basal insulin by 2 units  100-119 mg/dL                               increase basal insulin by 1 unit  80-99 mg/dL                                   maintain current dose  60-79 mg/dL                                              decrease basal insulin by 2 units  < 60 mg/dL                                     decrease basal insulin by 2 units

## 2020-01-22 NOTE — PROGRESS NOTES
Clinical Pharmacy Consult:                                                    Kyaw Silva is a 54 year old male coming in for a clinical pharmacist consult.  He was referred to me from .     Reason for Consult: Insulin and Glucometer/Testing education     Discussion:     Reviewed insulin (basaglar 10 units at bedtime) instructions.     Dicussed how to prep insulin pen, how to store it and where to inject. Additionally, dicussed how to rotate inject sites and remember to use alcohol     Patient was missing pen needles so sent in a prescription for pen needles    Set up glucometer and went over how to use, how to check blood sugars and how to review blood sugar numbers.    Educated on how to prep the lancing device to check blood sugars.     Patient was able to demonstrate how to check his blood sugars in the clinic today.    Dicussed to change needles and alcohol swabs every time.    Used teach back method and patient was able to communicate the directions to use each device.    Plan:  1. Start Basaglar 10 units at bedtime  2. Start checking your blood sugars twice daily     Carolin Cole [facility]

## 2020-01-22 NOTE — PROGRESS NOTES
Spoke to patient's wife. Remind patient to bring in all his medications including the new prescriptions that are filled at [facility], which includes his glucometer.     Additionally, pharmacy did not have a prescription for a glucometer and patient does not have one. Patient's wife mentioned that her daughter in-law has one that they could use and I cautioned patient's wife to not share medications or glucometer's.     Carolin Cole, [facility]

## 2020-01-23 NOTE — RESULT ENCOUNTER NOTE
Please call patient and let him know that his kidney function has improved. The kidney number (creatinine) when check last week was 1.31 and now it is in a normal range. It should stay in a normal range if he is taking his diabetes medications as prescribed. Please follow up in a couple of weeks as discussed and drink plenty of water.     Dorothy Owen MD

## 2020-01-23 NOTE — PROGRESS NOTES
I have verified the content of the note, which accurately reflects my assessment of the patient and the plan of care.   Cuca Yuan, [facility], PharmD

## 2020-02-06 ENCOUNTER — OFFICE VISIT (OUTPATIENT)
Dept: FAMILY MEDICINE | Facility: CLINIC | Age: 55
End: 2020-02-06
Payer: COMMERCIAL

## 2020-02-06 ENCOUNTER — TELEPHONE (OUTPATIENT)
Dept: FAMILY MEDICINE | Facility: CLINIC | Age: 55
End: 2020-02-06

## 2020-02-06 VITALS
TEMPERATURE: 98.3 F | HEIGHT: 64 IN | RESPIRATION RATE: 16 BRPM | BODY MASS INDEX: 31.79 KG/M2 | OXYGEN SATURATION: 95 % | HEART RATE: 62 BPM | SYSTOLIC BLOOD PRESSURE: 139 MMHG | DIASTOLIC BLOOD PRESSURE: 86 MMHG | WEIGHT: 186.2 LBS

## 2020-02-06 DIAGNOSIS — E11.9 TYPE 2 DIABETES MELLITUS WITHOUT COMPLICATION, WITHOUT LONG-TERM CURRENT USE OF INSULIN (H): Primary | ICD-10-CM

## 2020-02-06 ASSESSMENT — MIFFLIN-ST. JEOR: SCORE: 1593.98

## 2020-02-06 NOTE — PROGRESS NOTES
Patient Active Problem List    Diagnosis Date Noted     Colon cancer metastasized to mesenteric lymph nodes (H) 05/03/2019     Priority: High     Obstructing left sided lesion excised 5/2019.  Colostomy.  CT/MRI suggested liver mets.  + nodes.  Referred for chemo  PATH:    TUMOR     Tumor Site:    Sigmoid colon      Histologic Type:    Adenocarcinoma      Histologic Grade:    G1: Well differentiated      Tumor Size:    Greatest dimension in Centimeters (cm): 5.5 Centimeters (cm)      Tumor Extent:           Tumor Extension:    Tumor invades through the muscularis propria into pericolorectal tissue        Macroscopic Tumor Perforation:    Not identified      Accessory Findings:           Lymphovascular Invasion:    Cannot be determined: suspicious        Perineural Invasion:    Not identified        Tumor Budding:    Number of tumor buds in one  hotspot  field (total number in area = 0.785 mm2): 8          :    Intermediate score (5-9)   MARGINS     Margins:           Proximal Margin:    Uninvolved by invasive carcinoma    LYMPH NODES   Number of Lymph Nodes Involved:    6    Number of Lymph Nodes Examined:    35     PATHOLOGIC STAGE CLASSIFICATION (pTNM, AJCC 8th Edition)   TNM Descriptors:    Not applicable    Primary Tumor (pT):    pT3    Regional Lymph Nodes (pN):    pN2a   COLON AND RECTUM: Biomarker Reporting Template  (COLON AND RECTUM: BIOMARKER REPORTING TEMPLATE - All Specimens)      RESULTS     Mismatch Repair:           Immunohistochemistry (IHC) Testing for Mismatch Repair (MMR) Proteins:    MLH1         MLH1 Result:    Intact nuclear expression       Immunohistochemistry (IHC) Testing for Mismatch Repair (MMR) Proteins:    MSH2         MSH2 Result:    Intact nuclear expression       Immunohistochemistry (IHC) Testing for Mismatch Repair (MMR) Proteins:    MSH6         MSH6 Result:    Intact nuclear expression       Immunohistochemistry (IHC) Testing for Mismatch Repair (MMR) Proteins:    PMS2          "PMS2 Result:    Intact nuclear expression       Immunohistochemistry (IHC) Testing for Mismatch Repair (MMR) Proteins:    Background nonneoplastic tissue / internal control with intact nuclear expression         IHC Interpretation:    No loss of nuclear expression of MMR proteins: low probability of MSI-H    RESULTS   BRAF:     BRAF Mutational Analysis:    No mutations detected       Type 2 diabetes mellitus without complication, without long-term current use of insulin (H) 01/23/2017     Priority: High     Stroke, hemorrhagic (H) 08/02/2018     Priority: Medium     Residual right sided numbness, dysarthria       Proteinuria 05/25/2017     Priority: Medium     Hyperlipidemia, unspecified hyperlipidemia type 01/23/2017     Priority: Medium     Noncompliance with medication regimen 12/03/2015     Priority: Medium     Essential hypertension, benign 03/21/2013     Priority: Medium     Intracranial hemorrhage (H) 03/21/2013     Priority: Medium     Has seen Dr Justina Whitaker @ [facility]  Problem list name updated by automated process. Provider to review       Obstructive sleep apnea 03/21/2013     Priority: Medium     Problem list name updated by automated process. Provider to review       Health Care Home 03/21/2013     Priority: Medium     Tier Level: 1  DX V65.8 REPLACED WITH [id number]ALTH CARE HOME (04/08/2013)       S/P ACL reconstruction 12/11/2014     Priority: Low     Dr Mckeon @ [place]  Hamstring autograft  Parital medial and lateral meniscectomy       Major depressive disorder with single episode, in remission (H) 03/21/2013     Priority: Low     Problem list name updated by automated process. Provider to review       There are no exam notes on file for this visit.  Chief Complaint   Patient presents with     RECHECK     F/U DM     Blood pressure 139/86, pulse 62, temperature 98.3  F (36.8  C), temperature source Oral, resp. rate 16, height 1.623 m (5' 3.9\"), weight 84.5 kg (186 lb 3.2 oz), SpO2 95 %. "     SUBJECTIVE:  Kyaw Silva is here for followup of his diabetes.  He is here with his wife and daughter today and they expressed concerns about why it took so long to get the diagnosis of diabetes and why we jumped to the extreme of insulin in his case.  He feels well.  He is trying to modify his diet dramatically in order to get his blood sugars under control so that he can have surgery.  He does not have any polydipsia or polyuria.  They are asking good questions about signs of hypoglycemia and they would like some diet information today.  We canceled his colostomy takedown in order to get his blood sugars under better control.  I reviewed b.i.d. blood sugars over the past week on a glucometer.  In the mornings, he is typically in the 120s to 160s over the past week with one exception where he was in the 300s.  In the evening, he is a little higher in the 130s to 170s but generally under good control.  He is on metformin 500 daily and Lantus 10 units daily at this time.  He is trying to exercise.  He has cut back his sugary drinks and cut back his wife's altogether.      OBJECTIVE:  Patient is alert, pleasant, and nontoxic.  His blood pressure is excellent.  Exam is otherwise not done today.  I reviewed his blood sugar glucometer.      ASSESSMENT:  Type 2 diabetes mellitus, improved control.        PLAN:  I explained that he has had mild diabetes for quite some time with A1cs between 6 and 8 for several years, but in the last 6 months, his glycemic control clearly worsened.  He is doing much better on Lantus insulin and I think we can tentatively re-plan his surgery for early March and we will call his surgeon to arrange that.  He will follow up with me in 2-3 weeks for 1 final checkup and refresher of his preop, but I am planning on okaying for surgery if his blood sugars are in the low to mid 100s most of the time, which would correlate to a hemoglobin A1c around 7 for him.  In the meantime, we will increase  metformin from 500 to 1000 mg a day and his Lantus from 10 to 12 units each day at bedtime.  We gave him some fundamental diabetic education today.  They will follow up in 2-3 weeks as planned.       No results found for any visits on 02/06/20.

## 2020-02-06 NOTE — PATIENT INSTRUCTIONS
Increase insulin dose from 10 to 12 units daily  Increase the metformin to 2 pills daily    F/up with me in 2-3 weeks.    Will schedule visit with diabetic educator.

## 2020-02-07 DIAGNOSIS — E11.9 TYPE 2 DIABETES MELLITUS WITHOUT COMPLICATION, WITHOUT LONG-TERM CURRENT USE OF INSULIN (H): ICD-10-CM

## 2020-02-07 NOTE — TELEPHONE ENCOUNTER
Please call Dr Catie March (see care teams for number):  Kyaw's glycemic control has already improved substantially and he should be able to schedule his colostomy take-down for early March, 2020, based on his current progress/status.  Can they call pt to get this rescheduled then?  CF

## 2020-02-07 NOTE — TELEPHONE ENCOUNTER
Above information relayed to 's office (Sudha) . They will contact pt with details     Faiza MURDOCK

## 2020-02-12 ENCOUNTER — TELEPHONE (OUTPATIENT)
Dept: FAMILY MEDICINE | Facility: CLINIC | Age: 55
End: 2020-02-12

## 2020-02-12 NOTE — TELEPHONE ENCOUNTER
BS readings:       2/7/20   245 1hr after b'fast                   151  p ame meal    2/8        143 fasting                 161  Ame    2/9        134  f                 190 30min p meal    2/10      128   f                 121    2/11      121  f                     188    2/12      125 f          Note routed to Dr. Paula Kendall RN

## 2020-02-12 NOTE — TELEPHONE ENCOUNTER
[facility] phone call message- general phone call:    Reason for call: Zachariah would like to speak with Asmita to leave her husbands BS.     Return call needed: Yes    OK to leave a message on voice mail? Yes    Primary language: English      needed? No    Call taken on February 12, 2020 at 11:13 AM by Allyson Goncalves

## 2020-02-12 NOTE — TELEPHONE ENCOUNTER
----- Message from You Mitchell MD sent at 2/7/2020  2:44 PM CST -----  Regarding: please call pt  Can you call pt to find out how his blood sugars have been over the past 4 days?  We may be able to adjust his insulin dose (currently 12 of lantus at bedtime) based on his home data.  Thanks.  CF

## 2020-03-05 ENCOUNTER — OFFICE VISIT (OUTPATIENT)
Dept: FAMILY MEDICINE | Facility: CLINIC | Age: 55
End: 2020-03-05
Payer: COMMERCIAL

## 2020-03-05 VITALS
DIASTOLIC BLOOD PRESSURE: 89 MMHG | BODY MASS INDEX: 30.22 KG/M2 | SYSTOLIC BLOOD PRESSURE: 145 MMHG | RESPIRATION RATE: 12 BRPM | WEIGHT: 181.4 LBS | HEART RATE: 67 BPM | TEMPERATURE: 97.9 F | OXYGEN SATURATION: 95 % | HEIGHT: 65 IN

## 2020-03-05 DIAGNOSIS — E11.9 TYPE 2 DIABETES MELLITUS WITHOUT COMPLICATION, WITHOUT LONG-TERM CURRENT USE OF INSULIN (H): ICD-10-CM

## 2020-03-05 DIAGNOSIS — E11.9 TYPE 2 DIABETES MELLITUS WITHOUT COMPLICATION, WITHOUT LONG-TERM CURRENT USE OF INSULIN (H): Primary | ICD-10-CM

## 2020-03-05 DIAGNOSIS — I10 ESSENTIAL HYPERTENSION, BENIGN: Primary | ICD-10-CM

## 2020-03-05 LAB
BUN SERPL-MCNC: 21.3 MG/DL (ref 7–21)
CALCIUM SERPL-MCNC: 10.5 MG/DL (ref 8.5–10.1)
CHLORIDE SERPLBLD-SCNC: 100.8 MMOL/L (ref 98–110)
CO2 SERPL-SCNC: 29.3 MMOL/L (ref 20–32)
CREAT SERPL-MCNC: 0.9 MG/DL (ref 0.7–1.3)
GFR SERPL CREATININE-BSD FRML MDRD: >90 ML/MIN/1.7 M2
GLUCOSE SERPL-MCNC: 125.1 MG'DL (ref 70–99)
HBA1C MFR BLD: 8.6 % (ref 4.1–5.7)
POTASSIUM SERPL-SCNC: 4.2 MMOL/L (ref 3.2–4.6)
SODIUM SERPL-SCNC: 138.4 MMOL/L (ref 132–142)

## 2020-03-05 RX ORDER — METFORMIN HCL 500 MG
1500 TABLET, EXTENDED RELEASE 24 HR ORAL DAILY
Qty: 90 TABLET | Refills: 11 | Status: SHIPPED | OUTPATIENT
Start: 2020-03-05 | End: 2021-01-15 | Stop reason: ALTCHOICE

## 2020-03-05 ASSESSMENT — MIFFLIN-ST. JEOR: SCORE: 1580.74

## 2020-03-05 NOTE — PROGRESS NOTES
Patient Active Problem List    Diagnosis Date Noted     Colon cancer metastasized to mesenteric lymph nodes (H) 05/03/2019     Priority: High     Obstructing left sided lesion excised 5/2019.  Colostomy.  CT/MRI suggested liver mets.  + nodes.  Referred for chemo  PATH:    TUMOR     Tumor Site:    Sigmoid colon      Histologic Type:    Adenocarcinoma      Histologic Grade:    G1: Well differentiated      Tumor Size:    Greatest dimension in Centimeters (cm): 5.5 Centimeters (cm)      Tumor Extent:           Tumor Extension:    Tumor invades through the muscularis propria into pericolorectal tissue        Macroscopic Tumor Perforation:    Not identified      Accessory Findings:           Lymphovascular Invasion:    Cannot be determined: suspicious        Perineural Invasion:    Not identified        Tumor Budding:    Number of tumor buds in one  hotspot  field (total number in area = 0.785 mm2): 8          :    Intermediate score (5-9)   MARGINS     Margins:           Proximal Margin:    Uninvolved by invasive carcinoma    LYMPH NODES   Number of Lymph Nodes Involved:    6    Number of Lymph Nodes Examined:    35     PATHOLOGIC STAGE CLASSIFICATION (pTNM, AJCC 8th Edition)   TNM Descriptors:    Not applicable    Primary Tumor (pT):    pT3    Regional Lymph Nodes (pN):    pN2a   COLON AND RECTUM: Biomarker Reporting Template  (COLON AND RECTUM: BIOMARKER REPORTING TEMPLATE - All Specimens)      RESULTS     Mismatch Repair:           Immunohistochemistry (IHC) Testing for Mismatch Repair (MMR) Proteins:    MLH1         MLH1 Result:    Intact nuclear expression       Immunohistochemistry (IHC) Testing for Mismatch Repair (MMR) Proteins:    MSH2         MSH2 Result:    Intact nuclear expression       Immunohistochemistry (IHC) Testing for Mismatch Repair (MMR) Proteins:    MSH6         MSH6 Result:    Intact nuclear expression       Immunohistochemistry (IHC) Testing for Mismatch Repair (MMR) Proteins:    PMS2          PMS2 Result:    Intact nuclear expression       Immunohistochemistry (IHC) Testing for Mismatch Repair (MMR) Proteins:    Background nonneoplastic tissue / internal control with intact nuclear expression         IHC Interpretation:    No loss of nuclear expression of MMR proteins: low probability of MSI-H    RESULTS   BRAF:     BRAF Mutational Analysis:    No mutations detected       Type 2 diabetes mellitus without complication, without long-term current use of insulin (H) 01/23/2017     Priority: High     Stroke, hemorrhagic (H) 08/02/2018     Priority: Medium     Residual right sided numbness, dysarthria       Proteinuria 05/25/2017     Priority: Medium     Hyperlipidemia, unspecified hyperlipidemia type 01/23/2017     Priority: Medium     Noncompliance with medication regimen 12/03/2015     Priority: Medium     Essential hypertension, benign 03/21/2013     Priority: Medium     Intracranial hemorrhage (H) 03/21/2013     Priority: Medium     Has seen Dr Justina Whitaker @ [facility]  Problem list name updated by automated process. Provider to review       Obstructive sleep apnea 03/21/2013     Priority: Medium     Problem list name updated by automated process. Provider to review       Health Care Home 03/21/2013     Priority: Medium     Tier Level: 1  DX V65.8 REPLACED WITH [id number] HEALTH CARE HOME (04/08/2013)       S/P ACL reconstruction 12/11/2014     Priority: Low     Dr Mckeon @ [facility]  Hamstring autograft  Parital medial and lateral meniscectomy       Major depressive disorder with single episode, in remission (H) 03/21/2013     Priority: Low     Problem list name updated by automated process. Provider to review       Nursing Notes:   Vilma Hu CMA  3/5/2020  3:39 PM  Signed  Due to patient being non-English speaking/uses sign language, an  was used for this visit. Only for face-to-face interpretation by an external agency, date and length of interpretation can be found on the scanned  "worksheet.     name: Arely Logan  Agency: [employer]  Language: mehreen   Telephone number: [phone]  Type of interpretation: Face-to-face, spoken      Chief Complaint   Patient presents with     Pre-Op Exam     Colon surgery 3/18/2020     Diabetes     Check up     Forms     Family health condition & Attending physician's statement     Current Outpatient Medications   Medication     amLODIPine (NORVASC) 5 MG tablet     insulin glargine (LANTUS PEN) 100 UNIT/ML pen     metFORMIN (GLUCOPHAGE-XR) 500 MG 24 hr tablet     metoprolol tartrate (LOPRESSOR) 25 MG tablet     valsartan-hydrochlorothiazide (DIOVAN HCT) 160-25 MG tablet     blood glucose (NO BRAND SPECIFIED) lancets standard     blood glucose (NO BRAND SPECIFIED) test strip     blood glucose monitoring (NO BRAND SPECIFIED) meter device kit     blood glucose monitoring (NO BRAND SPECIFIED) meter device kit     blood glucose monitoring (NO BRAND SPECIFIED) meter device kit     Blood Pressure Monitor KIT     indomethacin (INDOCIN) 25 MG capsule     insulin pen needle (31G X 8 MM) 31G X 8 MM miscellaneous     order for DME     order for DME     polyethylene glycol (MIRALAX/GLYCOLAX) powder     triamcinolone (KENALOG) 0.1 % external lotion     No current facility-administered medications for this visit.      Blood pressure (!) 145/89, pulse 67, temperature 97.9  F (36.6  C), temperature source Oral, resp. rate 12, height 1.645 m (5' 4.75\"), weight 82.3 kg (181 lb 6.4 oz), SpO2 95 %.   Here for f/up of DM in prep for surgery on 3/18--having colostomy takedown.  Generally feels well.    ROS  Mild RN and slight cough.  No SOB  No chest pains  Walking on treadmill @ 2.5 or 3 MPH w/o angina  No n/v.  Colostomy working well  Re:  DM--taking basal insulin 12 units daily.  Blood sugars BID all in the 100s  No new neuro complaints--has chronic deficits due to prior stroke    EXAM:  Constitutional: healthy, alert and no distress   Cardiovascular: " negative  Respiratory: negative  Psychiatric: mentation appears normal and affect normal/bright  Abdomen: negative.  Old scar--healed.  Colostomy healthy.    Ext--no edema.  Speech slow and mildly dysarthric.  Mild RUE weakness vs left.      (I10) Essential hypertension, benign  (primary encounter diagnosis)  Comment: adequate control for surgery  Plan: cont current meds.  Hold the morning of surgery      (E11.9) Type 2 diabetes mellitus without complication, without long-term current use of insulin (H)  Comment: controlled well enough now for surgery.      Plan: metFORMIN (GLUCOPHAGE-XR) 500 MG 24 hr tablet        1500 mg daily--new dose.  Cont lantus 12 units daily, but take only 6 units the night before surgery.    Cont other meds as usual but hold the morning of surgery.  Please see pre-op from 1/2020 and ekg from same date (attached to this document.      Now ok for colostomy takedown      Results for orders placed or performed in visit on 03/05/20   Hemoglobin A1c ([facility])     Status: Abnormal   Result Value Ref Range    Hemoglobin A1C 8.6 (H) 4.1 - 5.7 %   Basic Metabolic Panel ([facility])     Status: Abnormal   Result Value Ref Range    Urea Nitrogen 21.3 (H) 7.0 - 21.0 mg/dL    Calcium 10.5 (H) 8.5 - 10.1 mg/dL    Chloride 100.8 98.0 - 110.0 mmol/L    Carbon Dioxide 29.3 20.0 - 32.0 mmol/L    Creatinine 0.9 0.7 - 1.3 mg/dL    Glucose 125.1 (H) 70.0 - 99.0 mg'dL    Potassium 4.2 3.2 - 4.6 mmol/L    Sodium 138.4 132.0 - 142.0 mmol/L    GFR Estimate >90 >60.0 mL/min/1.7 m2    GFR Estimate If Black >90 >60.0 mL/min/1.7 m2

## 2020-03-05 NOTE — PATIENT INSTRUCTIONS
Increase metformin to 1500 mg daily    No change in other medications.  The day before surgery, take only 6 units of glargine.    F/up 4-6 weeks after surgery.

## 2020-03-05 NOTE — NURSING NOTE
Due to patient being non-English speaking/uses sign language, an  was used for this visit. Only for face-to-face interpretation by an external agency, date and length of interpretation can be found on the scanned worksheet.     name: Arely Logan  Agency: [employer]  Language: Hmong   Telephone number: [phone]  Type of interpretation: Face-to-face, spoken

## 2020-05-19 ENCOUNTER — TRANSFERRED RECORDS (OUTPATIENT)
Dept: HEALTH INFORMATION MANAGEMENT | Facility: CLINIC | Age: 55
End: 2020-05-19

## 2020-06-12 ENCOUNTER — DOCUMENTATION ONLY (OUTPATIENT)
Dept: FAMILY MEDICINE | Facility: CLINIC | Age: 55
End: 2020-06-12

## 2020-06-12 NOTE — PROGRESS NOTES
To be completed in Nursing note:    Please reference list for forms that require a visit for completion.  Please remind patients that providers are given 3-5 business days to complete and return forms.      Form type: supply/Equipment    Date form received: 6/8/20    Date form completed by Physician:    How was form returned to patient (mailed, faxed, or at  for patient to ):    Date form mailed/faxed/left at  for patient and sent to HIM for scanning:      Once form is left for patient, faxed, or mailed PCS will then close the documentation only encounter.

## 2020-06-15 ENCOUNTER — TELEPHONE (OUTPATIENT)
Dept: FAMILY MEDICINE | Facility: CLINIC | Age: 55
End: 2020-06-15

## 2020-06-15 NOTE — TELEPHONE ENCOUNTER
Reached out to patients pharmacy. [facility] ([phone]) reported that they do not carry the brand of Metformin that is being recalled. Attempted to contact patients wife to relay message however there phone toward outage currently and up able to reach patient x2. When trying to contact patients wife, I get a long silence and then a busy tone. Will try again tomorrow.      Carolin Cole, [facility]  Pharmacy Resident

## 2020-06-16 NOTE — TELEPHONE ENCOUNTER
Reached out to patients wife, Zachariah Silva. Relayed message their [facility] at [address] does not carry the brand of metformin that is being recalled and her husbands metformin is safe to take. Discussed with patient if she wanted to double check, she can bring the bottle of medication to her local [facility] and they can double check for her, however they were able check the NDC dispensed in [facility] profile.     Carolin Cole, Lauren  Pharmacy Resident

## 2020-06-17 NOTE — TELEPHONE ENCOUNTER
I have verified the content of the note, which accurately reflects my assessment of the patient and the plan of care.   Cuca Yuan, [facility], PharmD

## 2020-07-16 DIAGNOSIS — M10.9 ACUTE GOUTY ARTHRITIS: ICD-10-CM

## 2020-07-16 RX ORDER — INDOMETHACIN 25 MG/1
CAPSULE ORAL
Qty: 60 CAPSULE | Refills: 1 | Status: SHIPPED | OUTPATIENT
Start: 2020-07-16 | End: 2021-01-15

## 2020-07-27 ENCOUNTER — TRANSFERRED RECORDS (OUTPATIENT)
Dept: HEALTH INFORMATION MANAGEMENT | Facility: CLINIC | Age: 55
End: 2020-07-27

## 2021-01-15 ENCOUNTER — OFFICE VISIT (OUTPATIENT)
Dept: FAMILY MEDICINE | Facility: CLINIC | Age: 56
End: 2021-01-15
Payer: COMMERCIAL

## 2021-01-15 VITALS
WEIGHT: 176.1 LBS | TEMPERATURE: 98.3 F | SYSTOLIC BLOOD PRESSURE: 154 MMHG | HEIGHT: 64 IN | RESPIRATION RATE: 16 BRPM | HEART RATE: 101 BPM | DIASTOLIC BLOOD PRESSURE: 101 MMHG | OXYGEN SATURATION: 100 % | BODY MASS INDEX: 30.07 KG/M2

## 2021-01-15 DIAGNOSIS — M10.9 ACUTE GOUTY ARTHRITIS: ICD-10-CM

## 2021-01-15 DIAGNOSIS — C77.2 COLON CANCER METASTASIZED TO MESENTERIC LYMPH NODES (H): Primary | ICD-10-CM

## 2021-01-15 DIAGNOSIS — R21 RASH: ICD-10-CM

## 2021-01-15 DIAGNOSIS — I10 ESSENTIAL HYPERTENSION, BENIGN: ICD-10-CM

## 2021-01-15 DIAGNOSIS — E11.9 TYPE 2 DIABETES MELLITUS WITHOUT COMPLICATION, WITHOUT LONG-TERM CURRENT USE OF INSULIN (H): ICD-10-CM

## 2021-01-15 DIAGNOSIS — C18.9 COLON CANCER METASTASIZED TO MESENTERIC LYMPH NODES (H): Primary | ICD-10-CM

## 2021-01-15 LAB
% IMMATURE GRANULOCYTES: 0 %
ABS IMMATURE GRANULOCYTES: 0 THOU/UL
ALBUMIN SERPL BCP-MCNC: 3.6 G/DL (ref 3.5–5)
ALP SERPL-CCNC: 93 U/L (ref 45–120)
ALT SERPL W/O P-5'-P-CCNC: 37 U/L (ref 0–45)
ANION GAP SERPL CALCULATED.3IONS-SCNC: 15 MMOL/L (ref 5–18)
AST SERPL-CCNC: 30 U/L (ref 0–40)
BASOPHILS # BLD AUTO: 0 THOU/UL (ref 0–0.2)
BASOPHILS NFR BLD AUTO: 0 % (ref 0–2)
BILIRUB SERPL-MCNC: 1.3 MG/DL (ref 0–1)
BUN SERPL-MCNC: 24 MG/DL (ref 8–22)
CALCIUM SERPL-MCNC: 10.1 MG/DL (ref 8.5–10.5)
CHLORIDE SERPL-SCNC: 100 MMOL/L (ref 98–107)
CO2 SERPL-SCNC: 19 MMOL/L (ref 22–31)
CREAT SERPL-MCNC: 1.22 MG/DL (ref 0.7–1.3)
EOSINOPHIL # BLD AUTO: 0.1 THOU/UL (ref 0–0.4)
EOSINOPHIL NFR BLD AUTO: 1 % (ref 0–6)
ERYTHROCYTE [DISTWIDTH] IN BLOOD BY AUTOMATED COUNT: 12.1 % (ref 11–14.5)
GLUCOSE SERPL-MCNC: 200 MG/DL (ref 70–125)
HBA1C MFR BLD: 10.5 % (ref 0–5.7)
HCT VFR BLD AUTO: 52.6 % (ref 40–54)
HGB BLD-MCNC: 17.6 G/DL (ref 14–18)
LYMPHOCYTES # BLD AUTO: 2.1 THOU/UL (ref 0.8–4.4)
LYMPHOCYTES NFR BLD AUTO: 20 % (ref 20–40)
MCH RBC QN AUTO: 29.4 PG (ref 27–34)
MCHC RBC AUTO-ENTMCNC: 33.5 G/DL (ref 32–36)
MCV RBC AUTO: 88 FL (ref 80–100)
MONOCYTES # BLD AUTO: 0.6 THOU/UL (ref 0–0.9)
MONOCYTES NFR BLD AUTO: 6 % (ref 2–10)
NEUTROPHILS # BLD AUTO: 7.7 THOU/UL (ref 2–7.7)
NEUTROPHILS NFR BLD AUTO: 73 % (ref 50–70)
PLATELET # BLD AUTO: 237 THOU/UL (ref 140–440)
PMV BLD AUTO: 10.9 FL (ref 8.5–12.5)
POTASSIUM SERPL-SCNC: 4.2 MMOL/L (ref 3.5–5)
PROT SERPL-MCNC: 8.1 G/DL (ref 6–8)
RBC # BLD AUTO: 5.98 MILL/UL (ref 4.4–6.2)
SODIUM SERPL-SCNC: 134 MMOL/L (ref 136–145)
WBC # BLD AUTO: 10.5 THOU/UL (ref 4–11)

## 2021-01-15 PROCEDURE — 36415 COLL VENOUS BLD VENIPUNCTURE: CPT | Performed by: FAMILY MEDICINE

## 2021-01-15 PROCEDURE — 99207 CT ABDOMEN PELVIS W CONTRAST: CPT | Performed by: FAMILY MEDICINE

## 2021-01-15 PROCEDURE — 99214 OFFICE O/P EST MOD 30 MIN: CPT | Performed by: FAMILY MEDICINE

## 2021-01-15 RX ORDER — METOPROLOL TARTRATE 25 MG/1
25 TABLET, FILM COATED ORAL DAILY
Qty: 90 TABLET | Refills: 3 | Status: SHIPPED | OUTPATIENT
Start: 2021-01-15 | End: 2021-05-13

## 2021-01-15 RX ORDER — INDOMETHACIN 25 MG/1
CAPSULE ORAL
Qty: 60 CAPSULE | Refills: 1 | Status: SHIPPED | OUTPATIENT
Start: 2021-01-15 | End: 2021-06-21

## 2021-01-15 RX ORDER — FLUOCINONIDE TOPICAL SOLUTION USP, 0.05% 0.5 MG/ML
SOLUTION TOPICAL 2 TIMES DAILY
Qty: 180 ML | Refills: 3 | Status: SHIPPED | OUTPATIENT
Start: 2021-01-15

## 2021-01-15 RX ORDER — AMLODIPINE BESYLATE 5 MG/1
5 TABLET ORAL DAILY
Qty: 90 TABLET | Refills: 3 | Status: SHIPPED | OUTPATIENT
Start: 2021-01-15

## 2021-01-15 RX ORDER — VALSARTAN AND HYDROCHLOROTHIAZIDE 160; 25 MG/1; MG/1
1 TABLET ORAL DAILY
Qty: 90 TABLET | Refills: 3 | Status: SHIPPED | OUTPATIENT
Start: 2021-01-15 | End: 2021-05-16

## 2021-01-15 ASSESSMENT — MIFFLIN-ST. JEOR: SCORE: 1543.78

## 2021-01-16 NOTE — PROGRESS NOTES
Patient Active Problem List    Diagnosis Date Noted     Colon cancer metastasized to mesenteric lymph nodes (H) 05/03/2019     Priority: High     Obstructing left sided lesion excised 5/2019.  Colostomy.  CT/MRI suggested liver mets.  + nodes.  Referred for chemo  PATH:    TUMOR     Tumor Site:    Sigmoid colon      Histologic Type:    Adenocarcinoma      Histologic Grade:    G1: Well differentiated      Tumor Size:    Greatest dimension in Centimeters (cm): 5.5 Centimeters (cm)      Tumor Extent:           Tumor Extension:    Tumor invades through the muscularis propria into pericolorectal tissue        Macroscopic Tumor Perforation:    Not identified      Accessory Findings:           Lymphovascular Invasion:    Cannot be determined: suspicious        Perineural Invasion:    Not identified        Tumor Budding:    Number of tumor buds in one  hotspot  field (total number in area = 0.785 mm2): 8          :    Intermediate score (5-9)   MARGINS     Margins:           Proximal Margin:    Uninvolved by invasive carcinoma    LYMPH NODES   Number of Lymph Nodes Involved:    6    Number of Lymph Nodes Examined:    35     PATHOLOGIC STAGE CLASSIFICATION (pTNM, AJCC 8th Edition)   TNM Descriptors:    Not applicable    Primary Tumor (pT):    pT3    Regional Lymph Nodes (pN):    pN2a   COLON AND RECTUM: Biomarker Reporting Template  (COLON AND RECTUM: BIOMARKER REPORTING TEMPLATE - All Specimens)      RESULTS     Mismatch Repair:           Immunohistochemistry (IHC) Testing for Mismatch Repair (MMR) Proteins:    MLH1         MLH1 Result:    Intact nuclear expression       Immunohistochemistry (IHC) Testing for Mismatch Repair (MMR) Proteins:    MSH2         MSH2 Result:    Intact nuclear expression       Immunohistochemistry (IHC) Testing for Mismatch Repair (MMR) Proteins:    MSH6         MSH6 Result:    Intact nuclear expression       Immunohistochemistry (IHC) Testing for Mismatch Repair (MMR) Proteins:    PMS2          PMS2 Result:    Intact nuclear expression       Immunohistochemistry (IHC) Testing for Mismatch Repair (MMR) Proteins:    Background nonneoplastic tissue / internal control with intact nuclear expression         IHC Interpretation:    No loss of nuclear expression of MMR proteins: low probability of MSI-H    RESULTS   BRAF:     BRAF Mutational Analysis:    No mutations detected       Type 2 diabetes mellitus without complication, without long-term current use of insulin (H) 01/23/2017     Priority: High     Stroke, hemorrhagic (H) 08/02/2018     Priority: Medium     Residual right sided numbness, dysarthria       Proteinuria 05/25/2017     Priority: Medium     Hyperlipidemia, unspecified hyperlipidemia type 01/23/2017     Priority: Medium     Noncompliance with medication regimen 12/03/2015     Priority: Medium     Essential hypertension, benign 03/21/2013     Priority: Medium     Intracranial hemorrhage (H) 03/21/2013     Priority: Medium     Has seen Dr Justina Whitaker @ [facility]  Problem list name updated by automated process. Provider to review       Obstructive sleep apnea 03/21/2013     Priority: Medium     Problem list name updated by automated process. Provider to review       Health Care Home 03/21/2013     Priority: Medium     Tier Level: 1  DX V65.8 REPLACED WITH [id number] HEALTH CARE HOME (04/08/2013)       S/P ACL reconstruction 12/11/2014     Priority: Low     Dr Mckeon @ [facility]  Hamstring autograft  Parital medial and lateral meniscectomy       Major depressive disorder with single episode, in remission (H) 03/21/2013     Priority: Low     Problem list name updated by automated process. Provider to review       There are no exam notes on file for this visit.  Chief Complaint   Patient presents with     Back Pain     lower back pain / pain lower abdomin area     Refill Request     Blood pressure (!) 154/101, pulse 101, temperature 98.3  F (36.8  C), temperature source Oral, resp. rate 16, height  "1.624 m (5' 3.94\"), weight 79.9 kg (176 lb 1.6 oz), SpO2 100 %.  No results found for any visits on 01/15/21.     SUBJECTIVE:  Kyaw Silva is here for several issues.  First issue is progressive lower back and abdominal discomfort over the past month.  It is achy, mild to moderate, not improved with massage or topical menthol, not associated with weight loss, bowel or bladder incontinence.  His past medical history is quite remarkable for metastatic colon cancer.  He completed chemotherapy in July.  His last imaging was in August.  I do not have that available.  His pain does at times wake him from sleep, but it has not been severe.  He is concerned about the possibility of recurrence.  He has a colostomy and would like to get this taken down if at all possible.      He needs refills of multiple medications.  He has had resistant hypertension that has generally responded to a combination between a beta blocker, calcium channel blocker, ARB and thiazide diuretic.  He has type 2 diabetes mellitus.  He is not currently taking insulin, and his blood sugars have generally been in the low 100s.  He has a rash along his temple and top of his head that responds intermittently to triamcinolone lotion and he would like to try something stronger if at all possible.  He is here with his wife today.  He is a fair historian because his English is limited.  His wife speaks moderate English.      OBJECTIVE:   GENERAL:  Patient is alert, pleasant, in no acute distress.   VITAL SIGNS:  Blood pressure and pulse are as listed.   HEENT:  He has a confluent mildly scaly rash on the top of the scalp and above the left ear consistent with psoriasis.   ABDOMEN:  Colostomy in the left upper quadrant.  His abdomen is soft and nontender.  There is no organomegaly or masses.  There is no adenopathy in the inguinal area or the umbilical area.  He does not have hepatomegaly.     MUSKULOSKELETAL:  His back is not tender to palpation.  He has negative " straight leg raise and normal strength in lower extremity.      ASSESSMENT:   1.  Back and lower abdominal pain.  In the context of his prior colon cancer, we need to do imaging.  CT of the abdomen and pelvis ordered for next week.  Will call him with the results.   2.  Hypertension.  We will get a comprehensive metabolic profile to assess his renal function.  We will refill his metoprolol, amlodipine, losartan/hydrochlorothiazide combination.   3.  Diabetes.  We will get an A1C.  Refill his meds.  He might need closer followup depending on his test results.   4.  Regarding his scalp, we will switch to a more potent topical lotion to treat his seborrhea psoriasis.  They prefer all of their refills sent to [facility].

## 2021-01-18 ENCOUNTER — RECORDS - HEALTHEAST (OUTPATIENT)
Dept: ADMINISTRATIVE | Facility: OTHER | Age: 56
End: 2021-01-18

## 2021-01-18 NOTE — PATIENT INSTRUCTIONS
21   CT ABDOMEN PELVIS  [facility]   [phone]  Fax Orders to [phone]    [address] [address]    Appointment:   Arrival Time: 7:30am    Patient's insurance requires a prior auth, so the appt needed to be scheduled out 5 days.     Becky Yi

## 2021-01-26 ENCOUNTER — HOSPITAL ENCOUNTER (OUTPATIENT)
Dept: CT IMAGING | Facility: HOSPITAL | Age: 56
Discharge: HOME | End: 2021-01-26
Attending: FAMILY MEDICINE

## 2021-01-26 DIAGNOSIS — C77.2 COLON CANCER METASTASIZED TO MESENTERIC LYMPH NODES (H): ICD-10-CM

## 2021-01-26 DIAGNOSIS — C18.9 COLON CANCER METASTASIZED TO MESENTERIC LYMPH NODES (H): ICD-10-CM

## 2021-02-04 ENCOUNTER — TELEPHONE (OUTPATIENT)
Dept: FAMILY MEDICINE | Facility: CLINIC | Age: 56
End: 2021-02-04

## 2021-02-04 DIAGNOSIS — C77.2 COLON CANCER METASTASIZED TO MESENTERIC LYMPH NODES (H): Primary | ICD-10-CM

## 2021-02-04 DIAGNOSIS — C18.9 COLON CANCER METASTASIZED TO MESENTERIC LYMPH NODES (H): Primary | ICD-10-CM

## 2021-02-04 NOTE — TELEPHONE ENCOUNTER
[facility] phone call message- patient requesting results:    Test: Lab and CT    Date of test: 1/28/21    Additional Comments: results.    OK to leave a message on voice mail? Yes    Primary language: English      needed? No    Call taken on February 4, 2021 at 11:03 AM by Nate Brito

## 2021-02-04 NOTE — TELEPHONE ENCOUNTER
I called and spoke to his wife, as Kyaw speaks little English  Mets have increased in size and number.   They need appt w/ Dr Olman LUJAN.  She will call him tomorrow.      I placed referral.

## 2021-02-05 NOTE — TELEPHONE ENCOUNTER
02/05/21   ONCOLOGY REFERRAL    [facility]  Phone: [phone]  Fax: [phone]    Referral, demegraphics, office note and imaging results faxed to [phone].    Becky Yi

## 2021-02-16 ENCOUNTER — TRANSFERRED RECORDS (OUTPATIENT)
Dept: HEALTH INFORMATION MANAGEMENT | Facility: CLINIC | Age: 56
End: 2021-02-16

## 2021-02-23 ENCOUNTER — TELEPHONE (OUTPATIENT)
Dept: FAMILY MEDICINE | Facility: CLINIC | Age: 56
End: 2021-02-23

## 2021-02-23 NOTE — TELEPHONE ENCOUNTER
Please call pt's wife (speaks English and accompany's him to visits)  I reviewed notes from Dr Thurman.  I see that he wants to get a reversal of his colostomy.  At the time of his last visit, his blood sugar control is not good enough for him to heal effectively with surgery.  I want to make sure he's taking the metformin, which will help, check his blood sugars 2 times daily (before breakfast and supper) and follow-up in the next 1-2 weeks so we can make sure he is ready for surgery, if that is still his priority.  Thanks  CF

## 2021-02-24 NOTE — TELEPHONE ENCOUNTER
Requested information relayed to wife who voices understanding and will call to schedule an appt to discuss possible surgery    btrn

## 2021-03-12 ENCOUNTER — OFFICE VISIT (OUTPATIENT)
Dept: FAMILY MEDICINE | Facility: CLINIC | Age: 56
End: 2021-03-12
Payer: COMMERCIAL

## 2021-03-12 VITALS
BODY MASS INDEX: 29.91 KG/M2 | OXYGEN SATURATION: 97 % | TEMPERATURE: 97.6 F | SYSTOLIC BLOOD PRESSURE: 131 MMHG | DIASTOLIC BLOOD PRESSURE: 85 MMHG | WEIGHT: 175.2 LBS | HEIGHT: 64 IN | HEART RATE: 83 BPM | RESPIRATION RATE: 16 BRPM

## 2021-03-12 DIAGNOSIS — C77.2 COLON CANCER METASTASIZED TO MESENTERIC LYMPH NODES (H): Primary | ICD-10-CM

## 2021-03-12 DIAGNOSIS — C18.9 COLON CANCER METASTASIZED TO MESENTERIC LYMPH NODES (H): Primary | ICD-10-CM

## 2021-03-12 DIAGNOSIS — E11.9 TYPE 2 DIABETES MELLITUS WITHOUT COMPLICATION, WITHOUT LONG-TERM CURRENT USE OF INSULIN (H): ICD-10-CM

## 2021-03-12 PROCEDURE — 99214 OFFICE O/P EST MOD 30 MIN: CPT | Performed by: FAMILY MEDICINE

## 2021-03-12 ASSESSMENT — MIFFLIN-ST. JEOR: SCORE: 1531.57

## 2021-03-12 NOTE — PATIENT INSTRUCTIONS
Continue to take your BP meds just as they are.   Your BP is great    Your blood sugars are much better and I think your blood sugar control is good enough to consider surgery at this time.    Increase the metformin to 2 pills AM and 2 pills in evening.  Each pill is 500 mg.    03/15/21   [facility]    [address] [place] [address], [address]  [facility], [address]  Phone: [phone]  Fax: [phone]    Referral, demographics office notes faxed to [phone]. They will contact patient to schedule.     Becky Yi

## 2021-03-12 NOTE — PROGRESS NOTES
Patient Active Problem List    Diagnosis Date Noted     Colon cancer metastasized to mesenteric lymph nodes (H) 05/03/2019     Priority: High     Obstructing left sided lesion excised 5/2019.  Colostomy.  CT/MRI suggested liver mets.  + nodes.  Referred for chemo  PATH:    TUMOR     Tumor Site:    Sigmoid colon      Histologic Type:    Adenocarcinoma      Histologic Grade:    G1: Well differentiated      Tumor Size:    Greatest dimension in Centimeters (cm): 5.5 Centimeters (cm)      Tumor Extent:           Tumor Extension:    Tumor invades through the muscularis propria into pericolorectal tissue        Macroscopic Tumor Perforation:    Not identified      Accessory Findings:           Lymphovascular Invasion:    Cannot be determined: suspicious        Perineural Invasion:    Not identified        Tumor Budding:    Number of tumor buds in one  hotspot  field (total number in area = 0.785 mm2): 8          :    Intermediate score (5-9)   MARGINS     Margins:           Proximal Margin:    Uninvolved by invasive carcinoma    LYMPH NODES   Number of Lymph Nodes Involved:    6    Number of Lymph Nodes Examined:    35     PATHOLOGIC STAGE CLASSIFICATION (pTNM, AJCC 8th Edition)   TNM Descriptors:    Not applicable    Primary Tumor (pT):    pT3    Regional Lymph Nodes (pN):    pN2a   COLON AND RECTUM: Biomarker Reporting Template  (COLON AND RECTUM: BIOMARKER REPORTING TEMPLATE - All Specimens)      RESULTS     Mismatch Repair:           Immunohistochemistry (IHC) Testing for Mismatch Repair (MMR) Proteins:    MLH1         MLH1 Result:    Intact nuclear expression       Immunohistochemistry (IHC) Testing for Mismatch Repair (MMR) Proteins:    MSH2         MSH2 Result:    Intact nuclear expression       Immunohistochemistry (IHC) Testing for Mismatch Repair (MMR) Proteins:    MSH6         MSH6 Result:    Intact nuclear expression       Immunohistochemistry (IHC) Testing for Mismatch Repair (MMR) Proteins:    PMS2          "PMS2 Result:    Intact nuclear expression       Immunohistochemistry (IHC) Testing for Mismatch Repair (MMR) Proteins:    Background nonneoplastic tissue / internal control with intact nuclear expression         IHC Interpretation:    No loss of nuclear expression of MMR proteins: low probability of MSI-H    RESULTS   BRAF:     BRAF Mutational Analysis:    No mutations detected       Type 2 diabetes mellitus without complication, without long-term current use of insulin (H) 01/23/2017     Priority: High     Stroke, hemorrhagic (H) 08/02/2018     Priority: Medium     Residual right sided numbness, dysarthria       Proteinuria 05/25/2017     Priority: Medium     Hyperlipidemia, unspecified hyperlipidemia type 01/23/2017     Priority: Medium     Noncompliance with medication regimen 12/03/2015     Priority: Medium     Essential hypertension, benign 03/21/2013     Priority: Medium     Intracranial hemorrhage (H) 03/21/2013     Priority: Medium     Has seen Dr Justina Whitaker @ [facility]  Problem list name updated by automated process. Provider to review       Obstructive sleep apnea 03/21/2013     Priority: Medium     Problem list name updated by automated process. Provider to review       Health Care Home 03/21/2013     Priority: Medium     Tier Level: 1  DX V65.8 REPLACED WITH [id number] HEALTH CARE HOME (04/08/2013)       S/P ACL reconstruction 12/11/2014     Priority: Low     Dr Mckeon @ [place]  Hamstring autograft  Parital medial and lateral meniscectomy       Major depressive disorder with single episode, in remission (H) 03/21/2013     Priority: Low     Problem list name updated by automated process. Provider to review       There are no exam notes on file for this visit.  Chief Complaint   Patient presents with     other     follow up stomach      Blood pressure 131/85, pulse 83, temperature 97.6  F (36.4  C), temperature source Oral, resp. rate 16, height 1.619 m (5' 3.74\"), weight 79.5 kg (175 lb 3.2 oz), SpO2 " 97 %.  No results found for any visits on 03/12/21.     SUBJECTIVE:  Kyaw Silva is here for followup of his diabetes and hypertension.  He is now adherent to his medication.  Also helping him with adherence to his diabetes is that his body seems to be absorbing metformin now, but the extended-release preparation would sometimes come out of his colostomy bag unused, and now that is no longer happening.  He denies any nausea or usually loose stools.  He tolerates his blood pressure medications well.      He brings me in a reading of his glucometer.  He has checked about half a dozen readings over the past two weeks or so, and all of the blood sugars are in the 100s except for 1 postprandial reading that was between 200 and 210.  He does not have polydipsia or polyuria.  He is trying to modify his diet to get his blood sugars under better control because his motivation is to have a colostomy takedown.  He knows that he has advanced colon cancer.  He has completed 6 of 8 rounds of chemotherapy.  He does not wish to continue long-term chemotherapy but would like to get his colon reanastomosis completed so that he can do some travels before his life may end.  He had a chris discussion about that with me and his wife today.      OBJECTIVE:   Patient is pleasant and in no acute distress.  Blood pressure is excellent at 131/85.  Exam is otherwise not done today.      ASSESSMENT:   1.  Type 2 diabetes mellitus with markedly improved control.  I think he has adequate glycemic control for surgery at this time, and I encouraged him to make an appointment with Dr. March, who has been his colorectal surgeon in the past, to consider reanastomosis per Kyaw's request.  Dr. Hernandez has been in line with this plan as well.  I will increase his metformin a little bit to see if we can get his blood sugars a little bit lower more safely.  He will be on 1000 mg b.i.d.   2.  Hypertension.  He will continue his current regimen, which is amlodipine  5 mg daily, metoprolol 25 mg daily, and valsartan/hydrochlorothiazide 160/25.   3.  Colorectal Surgery referral was placed today.

## 2021-04-16 ENCOUNTER — TRANSFERRED RECORDS (OUTPATIENT)
Dept: HEALTH INFORMATION MANAGEMENT | Facility: CLINIC | Age: 56
End: 2021-04-16

## 2021-04-19 ENCOUNTER — TELEPHONE (OUTPATIENT)
Dept: FAMILY MEDICINE | Facility: CLINIC | Age: 56
End: 2021-04-19

## 2021-04-19 NOTE — TELEPHONE ENCOUNTER
Please call:  I don't know that I can help.  The policies there are quite similar to policies at hospitals where I have privileges.  I don't have privileges at [facility], since we go to [facility].    I would suggest that they speak with the surgeon about their concerns, since they have privileges, to see if they could make a case for an exception.    ERIBERTO Mitchell

## 2021-04-19 NOTE — TELEPHONE ENCOUNTER
Spoke with daughter Yanira Silva. Patient has surgery scheduled at [facility] and they will not allow anyone to stay overnight with the patient and will only allow visitors during the day. She wonders if Dr. Mitchell has any experience getting an exception to these rules and if there is anything he can do to help. Routed to Dr. Mitchell. ./BRYNN

## 2021-04-19 NOTE — TELEPHONE ENCOUNTER
[facility] phone call message- general phone call:    Reason for call: the pt is going to have surgery and the family has questions     Return call needed: Yes    OK to leave a message on voice mail? Yes    Primary language: English      needed? No    Call taken on April 19, 2021 at 9:05 AM by Aquiles Recio

## 2021-05-13 DIAGNOSIS — I10 ESSENTIAL HYPERTENSION, BENIGN: ICD-10-CM

## 2021-05-13 RX ORDER — METOPROLOL TARTRATE 25 MG/1
TABLET, FILM COATED ORAL
Qty: 90 TABLET | Refills: 3 | Status: SHIPPED | OUTPATIENT
Start: 2021-05-13

## 2021-05-14 DIAGNOSIS — I10 ESSENTIAL HYPERTENSION, BENIGN: ICD-10-CM

## 2021-05-16 RX ORDER — VALSARTAN AND HYDROCHLOROTHIAZIDE 160; 25 MG/1; MG/1
1 TABLET ORAL DAILY
Qty: 90 TABLET | Refills: 3 | Status: SHIPPED | OUTPATIENT
Start: 2021-05-16

## 2021-06-19 DIAGNOSIS — M10.9 ACUTE GOUTY ARTHRITIS: ICD-10-CM

## 2021-06-21 RX ORDER — INDOMETHACIN 25 MG/1
CAPSULE ORAL
Qty: 60 CAPSULE | Refills: 1 | Status: SHIPPED | OUTPATIENT
Start: 2021-06-21

## 2021-07-08 ENCOUNTER — OFFICE VISIT (OUTPATIENT)
Dept: FAMILY MEDICINE | Facility: CLINIC | Age: 56
End: 2021-07-08
Payer: COMMERCIAL

## 2021-07-08 VITALS
RESPIRATION RATE: 20 BRPM | TEMPERATURE: 98.3 F | SYSTOLIC BLOOD PRESSURE: 121 MMHG | OXYGEN SATURATION: 95 % | BODY MASS INDEX: 28.83 KG/M2 | WEIGHT: 166.6 LBS | HEART RATE: 93 BPM | DIASTOLIC BLOOD PRESSURE: 83 MMHG

## 2021-07-08 DIAGNOSIS — C18.9 COLON CANCER METASTASIZED TO MESENTERIC LYMPH NODES (H): ICD-10-CM

## 2021-07-08 DIAGNOSIS — R53.83 OTHER FATIGUE: Primary | ICD-10-CM

## 2021-07-08 DIAGNOSIS — C77.2 COLON CANCER METASTASIZED TO MESENTERIC LYMPH NODES (H): ICD-10-CM

## 2021-07-08 LAB
ALBUMIN SERPL BCP-MCNC: 3.5 G/DL (ref 3.5–5)
ALP SERPL-CCNC: 154 U/L (ref 45–120)
ALT SERPL W/O P-5'-P-CCNC: 23 U/L (ref 0–45)
ANION GAP SERPL CALCULATED.3IONS-SCNC: 17 MMOL/L (ref 5–18)
AST SERPL-CCNC: 49 U/L (ref 0–40)
BILIRUB SERPL-MCNC: 1.7 MG/DL (ref 0–1)
BUN SERPL-MCNC: 20 MG/DL (ref 8–22)
CALCIUM SERPL-MCNC: 10.4 MG/DL (ref 8.5–10.5)
CHLORIDE SERPL-SCNC: 99 MMOL/L (ref 98–107)
CO2 SERPL-SCNC: 20 MMOL/L (ref 22–31)
CREAT SERPL-MCNC: 1.04 MG/DL (ref 0.7–1.3)
ERYTHROCYTE [DISTWIDTH] IN BLOOD BY AUTOMATED COUNT: 13 % (ref 10–15)
GLUCOSE SERPL-MCNC: 108 MG/DL (ref 70–125)
HCT VFR BLD AUTO: 46.7 % (ref 40–53)
HEMOGLOBIN: 15.6 G/DL (ref 13.3–17.7)
MCH RBC QN AUTO: 29.9 PG (ref 26.5–35)
MCHC RBC AUTO-ENTMCNC: 33.4 G/DL (ref 32–36)
MCV RBC AUTO: 89.5 FL (ref 78–100)
PLATELET # BLD AUTO: 162 10E9/L (ref 150–450)
POTASSIUM SERPL-SCNC: 4.4 MMOL/L (ref 3.5–5)
PROT SERPL-MCNC: 9 G/DL (ref 6–8)
RBC # BLD AUTO: 5.2 10E12/L (ref 4.4–5.9)
SODIUM SERPL-SCNC: 136 MMOL/L (ref 136–145)
WBC # BLD AUTO: 8.6 10E9/L (ref 4–11)

## 2021-07-08 PROCEDURE — 36415 COLL VENOUS BLD VENIPUNCTURE: CPT | Performed by: FAMILY MEDICINE

## 2021-07-08 PROCEDURE — 99213 OFFICE O/P EST LOW 20 MIN: CPT | Performed by: FAMILY MEDICINE

## 2021-07-08 PROCEDURE — 85027 COMPLETE CBC AUTOMATED: CPT | Performed by: FAMILY MEDICINE

## 2021-07-08 NOTE — PROGRESS NOTES
Rebecca Silva (daughter)  [phone]    Patient Active Problem List    Diagnosis Date Noted     Colon cancer metastasized to mesenteric lymph nodes (H) 05/03/2019     Priority: High     Obstructing left sided lesion excised 5/2019.  Colostomy.  CT/MRI suggested liver mets.  + nodes.  Referred for chemo  PATH:    TUMOR     Tumor Site:    Sigmoid colon      Histologic Type:    Adenocarcinoma      Histologic Grade:    G1: Well differentiated      Tumor Size:    Greatest dimension in Centimeters (cm): 5.5 Centimeters (cm)      Tumor Extent:           Tumor Extension:    Tumor invades through the muscularis propria into pericolorectal tissue        Macroscopic Tumor Perforation:    Not identified      Accessory Findings:           Lymphovascular Invasion:    Cannot be determined: suspicious        Perineural Invasion:    Not identified        Tumor Budding:    Number of tumor buds in one  hotspot  field (total number in area = 0.785 mm2): 8          :    Intermediate score (5-9)   MARGINS     Margins:           Proximal Margin:    Uninvolved by invasive carcinoma    LYMPH NODES   Number of Lymph Nodes Involved:    6    Number of Lymph Nodes Examined:    35     PATHOLOGIC STAGE CLASSIFICATION (pTNM, AJCC 8th Edition)   TNM Descriptors:    Not applicable    Primary Tumor (pT):    pT3    Regional Lymph Nodes (pN):    pN2a   COLON AND RECTUM: Biomarker Reporting Template  (COLON AND RECTUM: BIOMARKER REPORTING TEMPLATE - All Specimens)      RESULTS     Mismatch Repair:           Immunohistochemistry (IHC) Testing for Mismatch Repair (MMR) Proteins:    MLH1         MLH1 Result:    Intact nuclear expression       Immunohistochemistry (IHC) Testing for Mismatch Repair (MMR) Proteins:    MSH2         MSH2 Result:    Intact nuclear expression       Immunohistochemistry (IHC) Testing for Mismatch Repair (MMR) Proteins:    MSH6         MSH6 Result:    Intact nuclear expression       Immunohistochemistry (IHC) Testing for Mismatch  Repair (MMR) Proteins:    PMS2         PMS2 Result:    Intact nuclear expression       Immunohistochemistry (IHC) Testing for Mismatch Repair (MMR) Proteins:    Background nonneoplastic tissue / internal control with intact nuclear expression         IHC Interpretation:    No loss of nuclear expression of MMR proteins: low probability of MSI-H    RESULTS   BRAF:     BRAF Mutational Analysis:    No mutations detected       Type 2 diabetes mellitus without complication, without long-term current use of insulin (H) 01/23/2017     Priority: High     Stroke, hemorrhagic (H) 08/02/2018     Priority: Medium     Residual right sided numbness, dysarthria       Proteinuria 05/25/2017     Priority: Medium     Hyperlipidemia, unspecified hyperlipidemia type 01/23/2017     Priority: Medium     Noncompliance with medication regimen 12/03/2015     Priority: Medium     Essential hypertension, benign 03/21/2013     Priority: Medium     Intracranial hemorrhage (H) 03/21/2013     Priority: Medium     Has seen Dr Justina Whitaker @ [facility]  Problem list name updated by automated process. Provider to review       Obstructive sleep apnea 03/21/2013     Priority: Medium     Problem list name updated by automated process. Provider to review       Health Care Home 03/21/2013     Priority: Medium     Tier Level: 1  DX V65.8 REPLACED WITH [id number] HEALTH CARE HOME (04/08/2013)       S/P ACL reconstruction 12/11/2014     Priority: Low     Dr Mckeon @ [place]  Hamstring autograft  Parital medial and lateral meniscectomy       Major depressive disorder with single episode, in remission (H) 03/21/2013     Priority: Low     Problem list name updated by automated process. Provider to review       There are no exam notes on file for this visit.  Chief Complaint   Patient presents with     Forms     disability form for work     Fatigue     has been feeling tired lately     Blood pressure 121/83, pulse 93, temperature 98.3  F (36.8  C), temperature  source Oral, resp. rate 20, weight 75.6 kg (166 lb 9.6 oz), SpO2 95 %.  Results for orders placed or performed in visit on 07/08/21   CBC with Plt ([facility])     Status: None   Result Value Ref Range    WBC 8.6 4.0 - 11.0 10e9/L    RBC 5.2 4.4 - 5.9 10e12/L    Hemoglobin 15.6 13.3 - 17.7 g/dL    Hematocrit 46.7 40.0 - 53.0 %    MCV 89.5 78.0 - 100.0 fL    MCH 29.9 26.5 - 35.0 pg    MCHC 33.4 32.0 - 36.0 g/dL    RDW 13 10 - 15 %    Platelets 162.0 150.0 - 450.0 10e9/L     SUBJECTIVE:  Kyaw Silva is here for several issues.  First concern is getting disability forms filled out.  He suffered a hemorrhagic stroke in the past, with residual difficulty with speech and right upper extremity weakness.  As a result of that, he has been disabled ever since then and needs documentation for that.  He has dysarthria and right-sided numbness.    On top of that, he has developed metastatic colon cancer that is now affecting his ability to stand and walk for a long period of time.  His weakness has gotten dramatically worse over the past few months.  His daughter, Yanira, is here interpreting for me today.  They bring some disability forms from [place] that I have completed for him in the past.    His daughter would like me to complete an FMLA form because she needs to frequently take him to various appointments, and this seems completely reasonable.  Finally, he has a colostomy bag and he had a great desire to get this taken down due to his discomfort wearing the colostomy bag.  A substantial barrier to that was that he has metastatic cancer; however, his surgeon, Dr. March, apparently had agreed to complete the surgery.  Now a major limiting factor is his anxiety about being in the hospital.  He feels like he absolutely needs to have his wife there.  He had severe anxiety in the past that was almost unmanageable without the presence of his wife, and so he would like me to advocate for the possibility of having her be able to stay with  him in the hospital should he have a colostomy takedown.  In between, his anxiety is manageable.  He is able to do some ADLs around the house, some very simple bank keeping, but his fatigue is now limiting his abilities as well as his right-sided numbness.    OBJECTIVE:    GENERAL:  The patient is alert, pleasant, in no acute distress.  His vital signs are normal.  His affect is appropriate.  He does not speak English, so it is difficult for me to interpret any dysarthria.    CHEST:  Clear.  HEART:  Regular rate and rhythm without murmur.   ABDOMEN:  Has a colostomy bag in place.  He has marked hepatomegaly.  Skin does not identify any jaundice.  There is no obvious ascites.    EXTREMITIES:  Exam of his upper extremities reveals 4/5 strength in his , thumb-finger opposition, biceps flexion and extension.  He walks with a slow gait.  It is not wide-based.    ASSESSMENT AND PLAN:     1.  Remote hemorrhagic stroke with residual dysarthria and right-sided weakness.  He certainly has not gained his ability to work, and I think that it is impacted now by advanced cancer, and so I gladly will fill out his disability form, mail a copy to them and fax it to the [place].  2.  Regarding his colostomy takedown, I will communicate with Dr. March.  They are requesting that she try to advocate for his wife staying with him at [facility], and this may be a greater possibility now that COVID has improved.  3.  Regarding the [facility] paperwork, I am happy to complete it for his daughter.

## 2021-07-08 NOTE — LETTER
July 20, 2021      Kyaw Silva  [address] [place] [address]        Dear ,    We are writing to inform you of your test results.    Kyaw and Family   Kyaw's blood tests show that he hemoglobin is normal.  Very low levels can cause fatigue.   The only abnormalities are to his liver, and they suggest that the tumor may be starting to impact the liver function slightly.  This could be causing some fatigue.     His kidney tests are normal.   Please let me know if you have questions.   ERIBERTO Mitchell       Resulted Orders   CBC with Plt ([facility])   Result Value Ref Range    WBC 8.6 4.0 - 11.0 10e9/L    RBC 5.2 4.4 - 5.9 10e12/L    Hemoglobin 15.6 13.3 - 17.7 g/dL    Hematocrit 46.7 40.0 - 53.0 %    MCV 89.5 78.0 - 100.0 fL    MCH 29.9 26.5 - 35.0 pg    MCHC 33.4 32.0 - 36.0 g/dL    RDW 13 10 - 15 %    Platelets 162.0 150.0 - 450.0 10e9/L   Comprehensive Metabolic ([facility]) - Results > 1 hr   Result Value Ref Range    Sodium 136 136 - 145 mmol/L    Potassium 4.4 3.5 - 5.0 mmol/L    Chloride 99 98 - 107 mmol/L    CO2, Total 20 (L) 22 - 31 mmol/L    Anion Gap 17 5 - 18 mmol/L    Glucose 108 70 - 125 mg/dL    Urea Nitrogen 20 8 - 22 mg/dL    Creatinine 1.04 0.70 - 1.30 mg/dL    GFR Estimate If Black >60 >60 mL/min/1.73m2    GFR Estimate >60 >60 mL/min/1.73m2    Bilirubin Total 1.7 (H) 0.0 - 1.0 mg/dL    Calcium 10.4 8.5 - 10.5 mg/dL    Protein Total 9.0 (H) 6.0 - 8.0 g/dL    Albumin 3.5 3.5 - 5.0 g/dL    Alkaline Phosphatase 154 (H) 45 - 120 U/L    AST (SGOT) 49 (H) 0 - 40 U/L    ALT (SGPT) 23 0 - 45 U/L    Narrative    Test performed by:  [facility]  [address] [place] [address]  Fasting Glucose reference range is 70-99 mg/dL per  American Diabetes Association (ADA) guidelines.       If you have any questions or concerns, please call the clinic at the number listed above.       Sincerely,      You Mitchell MD

## 2021-07-20 NOTE — RESULT ENCOUNTER NOTE
Please mail labs to patient with this message.    Kyaw and Family  Kyaw's blood tests show that he hemoglobin is normal.  Very low levels can cause fatigue.  The only abnormalities are to his liver, and they suggest that the tumor may be starting to impact the liver function slightly.  This could be causing some fatigue.    His kidney tests are normal.  Please let me know if you have questions.  ERIBERTO Mitchell

## 2021-08-06 NOTE — NURSING NOTE
Chief Complaint   Patient presents with     RECHECK     Constipation for the last 6 days/ Feeling Bloted and with stomach pain      Herbert Sofia CMA    FIT TEST  May 2, 2019 FIT TEST was ordered and given to patient.   Herbert Sofia CMA    used

## 2021-09-13 ENCOUNTER — ANCILLARY PROCEDURE (OUTPATIENT)
Dept: GENERAL RADIOLOGY | Facility: CLINIC | Age: 56
End: 2021-09-13
Attending: FAMILY MEDICINE
Payer: MEDICARE

## 2021-09-13 ENCOUNTER — OFFICE VISIT (OUTPATIENT)
Dept: FAMILY MEDICINE | Facility: CLINIC | Age: 56
End: 2021-09-13
Payer: COMMERCIAL

## 2021-09-13 VITALS
HEART RATE: 93 BPM | DIASTOLIC BLOOD PRESSURE: 83 MMHG | OXYGEN SATURATION: 96 % | TEMPERATURE: 97.6 F | SYSTOLIC BLOOD PRESSURE: 132 MMHG | BODY MASS INDEX: 27.48 KG/M2 | RESPIRATION RATE: 20 BRPM | WEIGHT: 158.8 LBS

## 2021-09-13 DIAGNOSIS — R05.9 COUGH: ICD-10-CM

## 2021-09-13 DIAGNOSIS — C77.2 COLON CANCER METASTASIZED TO MESENTERIC LYMPH NODES (H): ICD-10-CM

## 2021-09-13 DIAGNOSIS — C18.9 COLON CANCER METASTASIZED TO MESENTERIC LYMPH NODES (H): ICD-10-CM

## 2021-09-13 DIAGNOSIS — R39.89 URINARY PROBLEM: Primary | ICD-10-CM

## 2021-09-13 LAB
ALBUMIN UR-MCNC: 30 MG/DL
APPEARANCE UR: CLEAR
BACTERIA #/AREA URNS HPF: ABNORMAL /HPF
BILIRUB UR QL STRIP: ABNORMAL
COLOR UR AUTO: YELLOW
GLUCOSE UR STRIP-MCNC: NEGATIVE MG/DL
HGB UR QL STRIP: NEGATIVE
KETONES UR STRIP-MCNC: NEGATIVE MG/DL
LEUKOCYTE ESTERASE UR QL STRIP: NEGATIVE
NITRATE UR QL: NEGATIVE
PH UR STRIP: 6 [PH] (ref 5–8)
RBC #/AREA URNS AUTO: ABNORMAL /HPF
SP GR UR STRIP: 1.02 (ref 1–1.03)
UROBILINOGEN UR STRIP-ACNC: >=8 E.U./DL
WBC #/AREA URNS AUTO: ABNORMAL /HPF

## 2021-09-13 PROCEDURE — 71046 X-RAY EXAM CHEST 2 VIEWS: CPT | Mod: FY | Performed by: RADIOLOGY

## 2021-09-13 PROCEDURE — 81001 URINALYSIS AUTO W/SCOPE: CPT | Performed by: STUDENT IN AN ORGANIZED HEALTH CARE EDUCATION/TRAINING PROGRAM

## 2021-09-13 PROCEDURE — 99214 OFFICE O/P EST MOD 30 MIN: CPT | Mod: GC | Performed by: STUDENT IN AN ORGANIZED HEALTH CARE EDUCATION/TRAINING PROGRAM

## 2021-09-13 NOTE — PROGRESS NOTES
"Assessment & Plan     Urinary problem  Hematuria, UA with moderate bilirubin and proteinuria suggestive of renal involvement from colon cancer spread.  UA with no signs suggesting of UTI.  Engaged in shared decision making with patient and patient's daughter with regards to further courses of actions considering patient declined chemotherapy previously, no further imaging for the time being.  No pain medication requested at this time  - UA reflex to Microscopic  - Urine Microscopic Exam    Colon cancer metastasized to mesenteric lymph nodes (H)  Cough  Chest x-ray ordered today with chronic cough, no recent fevers or productive cough suggestive of infectious process or pulmonary embolism.  Chest x-ray shows progression of cancer to lungs unfortunately, unable to contact patient via phone so sent a letter stating results.  We will send facilitate for cough relief.  Patient is to follow-up with PCP Dr. Mitchell for goals of care discussion and symptomatic management of current symptoms, patient previously declined chemotherapy due to intolerance of side effects.  - XR CHEST 2 VW  - benzonatate (TESSALON) 200 MG capsule  Dispense: 90 capsule; Refill: 3    Review of external notes as documented elsewhere in note  Diagnosis or treatment significantly limited by social determinants of health - Hmong speaking  Ordering of each unique test  Prescription drug management     BMI:   Estimated body mass index is 27.48 kg/m  as calculated from the following:    Height as of 3/12/21: 1.619 m (5' 3.74\").    Weight as of this encounter: 72 kg (158 lb 12.8 oz).   Weight management plan: Discussed healthy diet and exercise guidelines    MEDICATIONS:   Orders Placed This Encounter   Medications     benzonatate (TESSALON) 200 MG capsule     Sig: Take 1 capsule (200 mg) by mouth 3 times daily as needed for cough     Dispense:  90 capsule     Refill:  3          - Continue other medications without change  See Patient " Instructions    Return for Follow up with Dr. Mitchell.    Options for treatment and follow-up care were reviewed with the patient who was engaged and actively involved in the decision making process, verbalized understanding of the options discussed, and satisfied with the final plan.    Patient was staffed with supervising physician, Dr. Yazan Acevedo,    Panchito Wright DO, PGY-2  [facility]    Subjective   Kyaw Silva is a 56 year old year old male who presents for the following health issues  accompanied by his daughter:  Past Medical History:   Diagnosis Date     Colon cancer metastasized to mesenteric lymph nodes (H) 5/3/2019     CVA (cerebral infarction)      Depressive disorder      Hypertension      Type 2 diabetes mellitus without complication, without long-term current use of insulin (H) 1/23/2017     Chief Complaint   Patient presents with     Cough     coughing x2 months and wheezing, cancer spread to lung, negative covid  test, check lungs     Urinary Problem     yellowish red pee x1 month, lower abdominal pain      Concerns about hematuria:  Red/orange urine with lower abdominal pain x1.5months, stable with no changes, intermittent associated with back/flank pain but not at the level that he needs to go ER. No history of kidney stones.  Denies needing pain medication for back pain currently.    Sometimes productive cough:   Coughing for 2 months, got COVID tested that was negative. Coughing worsens his lower abdominal pain. Cough intermittent in nature, no alleviating or worsening factors.     No smoking history. No hx of inhaler use. Uses CPAP at night. No hx of seasonal allergies. Denies history of reflux. No sour taste in mouth, endorses decrease appetite.     Noticed to have been wheezing a lot, but noted to have wheezing associated with activity only, not at rest.     Wt Readings from Last 4 Encounters:   09/13/21 72 kg (158 lb 12.8 oz)   07/08/21 75.6 kg (166 lb 9.6 oz)   03/12/21 79.5 kg  (175 lb 3.2 oz)   01/15/21 79.9 kg (176 lb 1.6 oz)   18 lbs wt loss in setting of known colon Cancer diagnosis with spread to liver and lung.  Patient followed up with heme-onc who recommended no therapy at that time, patient underwent few rounds of chemotherapy but did not tolerate side effects and elected against continuing chemotherapy.  Per discussion with daughter and patient, appears patient prefers symptomatic management and do not plan oncologist unless patient agrees for chemotherapy again.    Denies recent fevers, sick contacts, n/v, blurry vision, chest pain at rest.     Review of Systems:  Constitutional, HEENT, cardiovascular, pulmonary, GI, , musculoskeletal, neuro, skin, endocrine and psych systems are negative, except as otherwise noted.      Objective    /83   Pulse 93   Temp 97.6  F (36.4  C) (Oral)   Resp 20   Wt 72 kg (158 lb 12.8 oz)   SpO2 96%   BMI 27.48 kg/m    Body mass index is 27.48 kg/m .    Physical Exam   GENERAL: Alert, no distress, intermittent cough  RESP: Coarse breath sounds in right middle and lower lobes, deep inspirations elicits cough.  CV: regular rate and rhythm, normal S1 S2, no S3 or S4, no murmur, click or rub, no peripheral edema and peripheral pulses strong  ABDOMEN: soft, tender bilateral lower quadrants, no hepatosplenomegaly, bowel sounds normal.  Ostomy in left lower quadrant, no rash overlying ostomy wound.  MS: no gross musculoskeletal defects noted, no edema  SKIN: no suspicious lesions or rashes    Review chest x-ray and UA    ----- Service Performed and Documented by Resident or Fellow ------

## 2021-09-13 NOTE — PATIENT INSTRUCTIONS
Thank you for coming into the clinic today!  Here is what we discussed:    We will contact you regarding chest x ray results    Follow up with Dr. Mitchell for a Goals of Care discussion     If you have any questions, please call the clinic at [phone].

## 2021-09-13 NOTE — PROGRESS NOTES
Preceptor Attestation:    I discussed the patient with the resident and evaluated the patient in person. I have verified the content of the note, which accurately reflects my assessment of the patient and the plan of care.    Presents with discolored urine and cough. Known to have metastatic colon CA with mets to lung, liver. Not interested in Chemo. Did not have ostomy taken down. Weight is down 6 pounds from July 21'.  Has no follow up scheduled with Onc or Colon rectal.   Today More interested in palliative measures. Also interested in having a chest Xray to see if an antibiotic would help his cough.   Return scheduled with PCP.    CXR today with significant findings of presumed mets, lymphadenopathy. Dr Wright to discuss findings tomorrow with patient; not totally clear antibiotics are indicated at this time based on clinical presentation   Supervising Physician:  Yazan Acevedo MD.

## 2021-09-13 NOTE — Clinical Note
Barrera Mitchell,  I saw the patient of yours, Mr. Silva, he was coming in for cough and orange urine.  The chest x-ray showed progression of his cancer to his lungs unfortunately with likely involvement of his kidneys as well based off his UA, I sent benzonatate for cough relief.  I told him to follow-up with you in regards to goals of care discussion considering he declined chemotherapy due to intolerance of side effects previously.  Wanted let you know that his cancer progressed unfortunately.  Please let me know if there is anything else I can do to help in the care of Mr. Silva,  Thanks,  Panchito

## 2021-09-21 RX ORDER — BENZONATATE 200 MG/1
200 CAPSULE ORAL 3 TIMES DAILY PRN
Qty: 90 CAPSULE | Refills: 3 | Status: SHIPPED | OUTPATIENT
Start: 2021-09-21

## 2021-09-22 ENCOUNTER — TELEPHONE (OUTPATIENT)
Dept: FAMILY MEDICINE | Facility: CLINIC | Age: 56
End: 2021-09-22

## 2021-09-22 NOTE — TELEPHONE ENCOUNTER
I called pt's wife and told her it looks as if cancer is progressing in his lungs.  I enouraged them to fill the tessalon and return if symptoms get worse or are not response.  Palliative use of opiates may suppress cough and might be an option in the future.  Wife aware that treatment options are minimal and seems accepting of it.

## 2021-09-22 NOTE — TELEPHONE ENCOUNTER
[facility] phone call message- patient requesting results:    Test: Lab and X-ray    Date of test: 09/13/2021    Additional Comments: NONE    OK to leave a message on voice mail? Yes    Primary language: English      needed? No    Call taken on September 22, 2021 at 11:17 AM by Allyson Goncalves

## 2021-09-29 ENCOUNTER — TELEPHONE (OUTPATIENT)
Dept: FAMILY MEDICINE | Facility: CLINIC | Age: 56
End: 2021-09-29

## 2021-09-29 NOTE — TELEPHONE ENCOUNTER
Triage  He should be seen here soon.  I'm concerned that his pain may be from his known cancer, but we should make sure.    And, it may be necessary to consider a more aggressive approach to pain mgmt.  ERIBERTO Mitchell

## 2021-09-29 NOTE — TELEPHONE ENCOUNTER
Pt was called to  forms. Pt stated that he has been having stomach pain and not feeling well for 1 month. He saw Dr. Wright on 0/13/2021 and was wondering if you can give him any medication to help.

## 2021-09-29 NOTE — TELEPHONE ENCOUNTER
Spoke with wife who prefers to see only  she was informed that  would like for patient to be seen prior to availability in his clinic schedule. Wife voices understanding and will schedule an appt with . Wife informed it may be weeks out before there is an opening she states his pain is not that bad and he can wait for   Wife encouraged to call clinic if there are any changes in pt's condition she voices understanding    Note routed to Dr.Fallert Faiza MURDOCK

## 2021-10-19 ENCOUNTER — HOSPITAL ENCOUNTER (EMERGENCY)
Facility: CLINIC | Age: 56
End: 2021-10-19
Payer: COMMERCIAL

## 2021-10-19 ENCOUNTER — TELEPHONE (OUTPATIENT)
Dept: FAMILY MEDICINE | Facility: CLINIC | Age: 56
End: 2021-10-19

## 2021-10-19 DIAGNOSIS — C18.9 COLON CANCER METASTASIZED TO MESENTERIC LYMPH NODES (H): Primary | ICD-10-CM

## 2021-10-19 DIAGNOSIS — C77.2 COLON CANCER METASTASIZED TO MESENTERIC LYMPH NODES (H): Primary | ICD-10-CM

## 2021-10-19 NOTE — TELEPHONE ENCOUNTER
"Daughter reporting \"trouble breathing\" short quick resp. Change in color of face lips and nail beds. Pt using CPAP with minimal minimal improvement. Patient refusing to go to ER.     informed of above and will call Daughter Yanira.    Note routed to Dr.Fallert Faiza MURDOCK  "

## 2021-10-19 NOTE — TELEPHONE ENCOUNTER
Daughter calling regarding ordering O2   Family willing to pay out of pocket    Note routed to Dr.Fallert Kendall RN

## 2021-10-19 NOTE — TELEPHONE ENCOUNTER
[facility] phone call message-patient reporting a symptom:     Symptom:    Pt's father is very ill. She is wanting to speak with Dr. Or Nurse about getting something to check the pt's oxygen and possibly getting oxygen.     Same Day Visit Offered:     No    Additional comments:     None    OK to leave message on voice mail? Yes    Primary language: English      needed? No    Call taken on October 19, 2021 at 1:06 PM by Padmini March

## 2021-10-19 NOTE — ED NOTES
Expected Patient Referral to ER    2:01 PM    Referring clinic/provider: [facility], Dr. Ritchie Mitchell. Via phone conversation with pt.    Reason for referral: 56 metastatic colon cancer, DM. Not on hospice care yet, but increasing WOB, CPAP no longer working, spo2 85%. He would possibly benefit from bipap vs comfort care initiation.    Mode of arrival: car vs ambulace       Axel Langston MD  10/19/21 6020

## 2021-10-19 NOTE — TELEPHONE ENCOUNTER
[facility] phone call message - order or referral request for patient:     Order or referral being requested: Oxygen Tank      Additional Comments: pt's Daughter Yanira is calling to request an order for an oxygen tank. She has spoken to [facility] who she plans to get the tank from and pay cash for today.        Call taken on October 19, 2021 at 2:51 PM by Becky Yi

## 2021-10-19 NOTE — TELEPHONE ENCOUNTER
I spoke with patient's daughter Yanira.  He is in respiratory distress and they have a pulse ox machine that was reading his sats @ 85%.  He has known metastatic colon cancer to lung (CXR 9/13/2021), but also DM/Htn and prior stroke.  They have clearly expressed a desire to have comfort care to me in the past, in prior visits, but there has not been a need for home or hospice care until now.  I shared with them that this sounds like a life-threatening event, and suggested that the best option for his comfort would be to bring him to an ER, with the focus being comfort care.  At the same time, he'd be limited to one visitor in light of COVID.  I spoke with an ED physician about this case, sharing that they may come this afternoon.

## 2021-10-20 ENCOUNTER — TELEPHONE (OUTPATIENT)
Dept: FAMILY MEDICINE | Facility: CLINIC | Age: 56
End: 2021-10-20
Payer: COMMERCIAL

## 2021-10-20 NOTE — TELEPHONE ENCOUNTER
[facility] phone call message- general phone call:    Reason for call:    Pt passed this morning and needing verbal from Dr. Mitchell that he will sign death certificate for the family as soon as possible. .    Action desired:    Call back    Return call needed: Yes    OK to leave a message on voice mail? Yes    Advised patient to response may take up to 2 business days: Yes    Primary language: English      needed? No    Call taken on October 20, 2021 at 1:50 PM by Padmini March

## 2021-10-20 NOTE — TELEPHONE ENCOUNTER
Asmita  I just saw these messages late 10/19.  I have placed and order for home O2  I have also placed an urgent referral for home hospice.  I am unavailable tomorrow morning, but can you call Kyaw's daughter to let them know those orders are in?  Please let them know that hospice care is designed to help Kyaw stay home and improve his discomfort.  I'm not sure how quickly they can see him, but I think it's worth trying.  ERIBERTO Mitchell

## 2021-10-26 ENCOUNTER — TELEPHONE (OUTPATIENT)
Dept: FAMILY MEDICINE | Facility: CLINIC | Age: 56
End: 2021-10-26

## 2021-10-26 NOTE — TELEPHONE ENCOUNTER
[facility] phone call message- general phone call:    Reason for call: Wife would like to speak with nurse about husbands passing and about getting a work note.     Return call needed: Yes    OK to leave a message on voice mail? Yes    Primary language: English      needed? No    Call taken on October 26, 2021 at 2:41 PM by Grisel Flores-Cardona

## 2021-10-27 NOTE — TELEPHONE ENCOUNTER
I attempted call--no answer and VM full.  Will try 10/28.     Called pt's wife, Zachariah.    She is requesting FMLA forms completed for time away from work 10/20 through 12/31/2021 for grieving at loss of her .  I will complete when I get the forms.

## 2023-01-18 NOTE — TELEPHONE ENCOUNTER
[facility] phone call message- medication clarification/question:    Full Medication Name:     metFORMIN (GLUCOPHAGE-XR) 500 MG 24 hr tablet  Take 3 tablets (1,500 mg) by mouth daily    Question:     Pt's wife is calling because theres been a recall on metformin. They received a letter stating that it causes cancer. Please call pt's wife.     Pharmacy confirmed as      [id number] - [place], [place] - [address] AT [address]: Yes    OK to leave a message on voice mail? Yes    Primary language: English      needed? No    Call taken on Vika 15, 2020 at 3:58 PM by Padmini March CMA     Verbal consent was given by patient to conduct this telemed visit with Dr Medeiros.  The visit was performed via telephone.    Clinic/Physician Location : [facility]  Patient Location: Home    This phone call occurred on   Date and Time: 01/18/23 10:48 AM        In addition to the telemedicine visit:    [] Patients physician was contacted for coordination of care.  [] Consultation(s) ordered (MD,  etc.)  [x] Precertification/referral for approval of treatment plan initiated.  [x] Medication Compliance Audit was completed, and prescription monitoring report reviewed.  [x] Routine discharge instructions given.  [] Complex discharge instructions given.